# Patient Record
Sex: FEMALE | Race: WHITE | NOT HISPANIC OR LATINO | Employment: PART TIME | URBAN - METROPOLITAN AREA
[De-identification: names, ages, dates, MRNs, and addresses within clinical notes are randomized per-mention and may not be internally consistent; named-entity substitution may affect disease eponyms.]

---

## 2017-03-16 ENCOUNTER — OFFICE VISIT - HEALTHEAST (OUTPATIENT)
Dept: FAMILY MEDICINE | Facility: CLINIC | Age: 27
End: 2017-03-16

## 2017-03-16 DIAGNOSIS — J32.9 BACTERIAL SINUSITIS: ICD-10-CM

## 2017-03-16 DIAGNOSIS — B96.89 BACTERIAL SINUSITIS: ICD-10-CM

## 2017-03-16 ASSESSMENT — MIFFLIN-ST. JEOR: SCORE: 1484.27

## 2017-03-28 ENCOUNTER — OFFICE VISIT - HEALTHEAST (OUTPATIENT)
Dept: FAMILY MEDICINE | Facility: CLINIC | Age: 27
End: 2017-03-28

## 2017-03-28 DIAGNOSIS — J01.90 ACUTE SINUSITIS: ICD-10-CM

## 2017-03-28 DIAGNOSIS — R05.9 COUGH: ICD-10-CM

## 2017-04-13 ENCOUNTER — OFFICE VISIT - HEALTHEAST (OUTPATIENT)
Dept: INTERNAL MEDICINE | Facility: CLINIC | Age: 27
End: 2017-04-13

## 2017-04-13 DIAGNOSIS — Z00.00 ANNUAL PHYSICAL EXAM: ICD-10-CM

## 2017-04-13 ASSESSMENT — MIFFLIN-ST. JEOR: SCORE: 1454.78

## 2017-04-25 ENCOUNTER — COMMUNICATION - HEALTHEAST (OUTPATIENT)
Dept: INTERNAL MEDICINE | Facility: CLINIC | Age: 27
End: 2017-04-25

## 2017-07-12 ENCOUNTER — OFFICE VISIT - HEALTHEAST (OUTPATIENT)
Dept: INTERNAL MEDICINE | Facility: CLINIC | Age: 27
End: 2017-07-12

## 2017-07-12 DIAGNOSIS — Z71.84 TRAVEL ADVICE ENCOUNTER: ICD-10-CM

## 2017-08-07 ENCOUNTER — COMMUNICATION - HEALTHEAST (OUTPATIENT)
Dept: SCHEDULING | Facility: CLINIC | Age: 27
End: 2017-08-07

## 2017-08-07 ENCOUNTER — AMBULATORY - HEALTHEAST (OUTPATIENT)
Dept: INTERNAL MEDICINE | Facility: CLINIC | Age: 27
End: 2017-08-07

## 2017-12-26 ENCOUNTER — OFFICE VISIT - HEALTHEAST (OUTPATIENT)
Dept: FAMILY MEDICINE | Facility: CLINIC | Age: 27
End: 2017-12-26

## 2017-12-26 ENCOUNTER — COMMUNICATION - HEALTHEAST (OUTPATIENT)
Dept: SCHEDULING | Facility: CLINIC | Age: 27
End: 2017-12-26

## 2017-12-26 DIAGNOSIS — R09.81 SINUS CONGESTION: ICD-10-CM

## 2019-01-06 ENCOUNTER — OFFICE VISIT - HEALTHEAST (OUTPATIENT)
Dept: FAMILY MEDICINE | Facility: CLINIC | Age: 29
End: 2019-01-06

## 2019-01-06 DIAGNOSIS — A09 TRAVELER'S DIARRHEA: ICD-10-CM

## 2019-01-07 ENCOUNTER — COMMUNICATION - HEALTHEAST (OUTPATIENT)
Dept: SCHEDULING | Facility: CLINIC | Age: 29
End: 2019-01-07

## 2020-01-09 ENCOUNTER — COMMUNICATION - HEALTHEAST (OUTPATIENT)
Dept: INTERNAL MEDICINE | Facility: CLINIC | Age: 30
End: 2020-01-09

## 2021-05-30 VITALS — BODY MASS INDEX: 25.62 KG/M2 | WEIGHT: 159.4 LBS | HEIGHT: 66 IN

## 2021-05-30 VITALS — WEIGHT: 165.9 LBS | HEIGHT: 66 IN | BODY MASS INDEX: 26.66 KG/M2

## 2021-05-30 VITALS — BODY MASS INDEX: 25.92 KG/M2 | WEIGHT: 160.56 LBS

## 2021-05-31 VITALS — WEIGHT: 155 LBS | BODY MASS INDEX: 25.02 KG/M2

## 2021-05-31 VITALS — BODY MASS INDEX: 26.31 KG/M2 | WEIGHT: 163 LBS

## 2021-06-02 VITALS — WEIGHT: 163.8 LBS | BODY MASS INDEX: 26.44 KG/M2

## 2021-06-09 ENCOUNTER — OFFICE VISIT - HEALTHEAST (OUTPATIENT)
Dept: INTERNAL MEDICINE | Facility: CLINIC | Age: 31
End: 2021-06-09

## 2021-06-09 DIAGNOSIS — Z30.016 ENCOUNTER FOR INITIAL PRESCRIPTION OF TRANSDERMAL PATCH HORMONAL CONTRACEPTIVE DEVICE: ICD-10-CM

## 2021-06-09 RX ORDER — NORELGESTROMIN AND ETHINYL ESTRADIOL 35; 150 UG/MG; UG/MG
1 PATCH TRANSDERMAL
Qty: 4 PATCH | Refills: 5 | Status: SHIPPED | OUTPATIENT
Start: 2021-06-09 | End: 2022-01-13

## 2021-06-09 ASSESSMENT — MIFFLIN-ST. JEOR: SCORE: 1498.33

## 2021-06-09 NOTE — PROGRESS NOTES
"Subjective:      Patient ID: Giovanny Mendez is a 26 y.o. female.    Chief Complaint:    HPI Giovanny Mendez is a 26 y.o. female who presents today complaining of sinus pressure and pain x 9 days. Mucus is a yellow color.  Patient reports that she has been having headaches with pressure along her maxillary sinuses.  She reports that she is also had sinus infections in the past.      No past medical history on file.    No past surgical history on file.    No family history on file.    Social History   Substance Use Topics     Smoking status: Never Smoker     Smokeless tobacco: None     Alcohol use None       Review of Systems   Constitutional: Negative for fever.   HENT: Positive for congestion, sinus pressure, sore throat and voice change. Negative for ear pain.    Respiratory: Positive for cough.    Gastrointestinal: Negative for abdominal pain, diarrhea, nausea and vomiting.   Neurological: Positive for headaches.       Objective:     Visit Vitals     /70     Pulse 71     Temp 97.7  F (36.5  C) (Oral)     Resp 16     Ht 5' 6\" (1.676 m)     Wt 165 lb 14.4 oz (75.3 kg)     SpO2 99%     Breastfeeding Yes     BMI 26.78 kg/m2       Physical Exam   Constitutional: She appears well-developed and well-nourished. No distress.   HENT:   Right Ear: Tympanic membrane normal.   Left Ear: Tympanic membrane normal.   Nose: Right sinus exhibits no maxillary sinus tenderness and no frontal sinus tenderness. Left sinus exhibits no maxillary sinus tenderness and no frontal sinus tenderness.   Mouth/Throat: Uvula is midline. No oropharyngeal exudate, posterior oropharyngeal edema, posterior oropharyngeal erythema or tonsillar abscesses.   Cardiovascular: Normal rate, regular rhythm and normal heart sounds.    No murmur heard.  Pulmonary/Chest: Effort normal and breath sounds normal. No respiratory distress. She has no wheezes. She has no rales.     Considering duration of symptoms patient likely had viral sinusitis with a new " secondary bacterial sinusitis. Patient was prescribed Augmentin ×7 days.  I also recommended the patient to take a probiotic to prevent GI upset or yeast infection from developing after antibiotic use.    Procedures      Assessment / Plan:     1. Bacterial sinusitis  amoxicillin-clavulanate (AUGMENTIN) 875-125 mg per tablet         Patient Instructions   1) Take Augmentin antibiotic twice daily for 7 days.  2) Take Culturelle probiotic three times per day for 10 days.  3) I also recommend Mucinex and Neti pot for congestion relief.  4) Follow up if symptoms do not improve in 5-7 days.

## 2021-06-09 NOTE — PROGRESS NOTES
ASSESSMENT:   1. Acute sinusitis  doxycycline (MONODOX) 100 MG capsule   2. Cough          PLAN:  She has had mild improvement with Augmentin but not until later in the course, so I chose to treat her with doxycycline. Cautioned on photosensitivity.  Recommend probiotic while on antibiotic. Follow up with primary care provider if not improving in 3-5 days, sooner if any worsening or new symptoms.      SUBJECTIVE:   Giovanny Mendez is a 26 y.o. female presents today with 3 weeks complaint of sinus symptoms. She was seen 3/16/17 and treated for sinusitis with Augmentin x 7 days. While on antibiotic, she did not feel like symptoms improved much until the last day or two.  She has continued to have gradual improvement in her symptoms, though still not feeling like she's back to her usual self.  She continues to have a productive cough- mostly when lying down at night, rhinorrhea, thick postnasal drainage.  Feels like her cough is due to the postnasal drainage. Yesterday her ears started popping again.  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 History of recurrent sinusitis, though often responds quite well amoxicillin or Augmentin.     Denies chance of pregnancy and is not breastfeeding.    Denies fever, chills, shortness of breath, chest pain, sore throat.    There is no problem list on file for this patient.      History   Smoking Status     Never Smoker   Smokeless Tobacco     Not on file       Current Medications:  No current outpatient prescriptions on file prior to visit.     No current facility-administered medications on file prior to visit.        Allergies:   No Known Allergies    OBJECTIVE:   Vitals:    03/28/17 1553   BP: 90/60   Patient Site: Left Arm   Patient Position: Sitting   Cuff Size: Adult Regular   Pulse: 68   Resp: 16   Temp: 98.1  F (36.7  C)   TempSrc: Oral   SpO2: 99%   Weight: 160 lb 9 oz (72.8 kg)     Physical exam reveals a pleasant 26 y.o.  female.   Appears healthy, alert, cooperative and in NAD.  Eyes:  No conjunctivitis, lids normal.   Ears:  both TM's with a visible air bubble of serous fluid.  TMs are thin, without erythema, with good light reflex.  Nose:    Mucosa pink, congested.  Mouth:  Mucosa pink and moist.  no pharyngitis, no exudate and thick postnasal drainage visualized in posterior pharynx. Uvula is midline.    Lymph: no cervical LAD  Lungs: Chest is clear, no wheezing, rhonchi or rales. Symmetric air entry throughout both lung fields.  Heart: regular rate and rhythm, no murmur, rub or gallop

## 2021-06-10 NOTE — PROGRESS NOTES
Assessment/Plan:     1. Annual physical exam  - Gynecologic Cytology (PAP Smear)  - Reviewed the importance of monitoring for changes in breast appearance such as nipple inversion, changes in breast tissue texture, non-healing wounds, or nipple discharge   - The following high BMI interventions were performed this visit: encouragement to exercise        Subjective:     Giovanny Mendez is a 26 y.o. female who presents for an annual exam. She is not sexually active, no need for contraception. Declines STI testing today. Her periods are regular, no heavy/painful menstrual bleeding. She has never had a PAP smear before.     The patient reports that there is not domestic violence in her life.     Healthy Habits:   Regular Exercise: No, not recently. Will get back into a routine   Sunscreen Use: Yes  Healthy Diet: Yes, in general   Dental Visits Regularly: Yes  Sexually active: not currently       Immunization History   Administered Date(s) Administered     Tdap 04/17/2013     Immunization status: declines influenza vaccine     Gynecologic History  Patient's last menstrual period was 03/23/2017 (approximate).  Contraception: abstinence      OB History   No data available       No current outpatient prescriptions on file.     No current facility-administered medications for this visit.      History reviewed. No pertinent past medical history.  History reviewed. No pertinent surgical history.  Review of patient's allergies indicates no known allergies.  Family History   Problem Relation Age of Onset     No Medical Problems Mother      No Medical Problems Father      No Medical Problems Brother      Diabetes Maternal Grandfather      Social History     Social History     Marital status: Single     Spouse name: N/A     Number of children: N/A     Years of education: N/A     Occupational History     Not on file.     Social History Main Topics     Smoking status: Never Smoker     Smokeless tobacco: Not on file     Alcohol use  "0.6 oz/week     1 Standard drinks or equivalent per week     Drug use: No     Sexual activity: Not Currently     Partners: Male     Other Topics Concern     Not on file     Social History Narrative       Review of Systems  General:  Negative except as noted above  Eyes: Negative except as noted above  Ears/Nose/Throat: Negative except as noted above  Cardiovascular: Negative except as noted above  Respiratory:  Negative except as noted above  Gastrointestinal:  Negative except as noted above  Musculoskeletal:  Negative except as noted above  Skin: Negative except as noted above  Neurologic: Negative except as noted above  Psychiatric: Negative except as noted above  Endocrine: Negative except as noted above  Heme/Lymphatic: Negative except as noted above   Allergic/Immunologic: Negative except as noted above      Objective:      Vitals:    04/13/17 1450   BP: 118/68   Pulse: 80   Weight: 159 lb 6.4 oz (72.3 kg)   Height: 5' 6\" (1.676 m)     Wt Readings from Last 3 Encounters:   04/13/17 159 lb 6.4 oz (72.3 kg)   03/28/17 160 lb 9 oz (72.8 kg)   03/16/17 165 lb 14.4 oz (75.3 kg)     Body mass index is 25.73 kg/(m^2).     Physical Exam:  General Appearance: Alert, cooperative, no distress.  Head: Normocephalic, without obvious abnormality, atraumatic  Eyes: PERRL, conjunctiva/corneas clear, EOM's intact  Ears: Normal TM's and external ear canals, both ears  Nose: Nares normal, septum midline,mucosa normal, no drainage  Throat: Lips, mucosa, and tongue normal  Neck: Supple, symmetrical, trachea midline, no adenopathy;  thyroid: not enlarged, symmetric, no tenderness/mass/nodules  Lungs: Clear to auscultation bilaterally, respirations unlabored  Heart: Regular rate and rhythm, S1 and S2 normal, no murmur, rub, or gallop  Abdomen: Soft, non-tender, bowel sounds active all four quadrants,  no masses, no organomegaly  Pelvic: Genital: EXTERNAL GENITALIA: Normal appearing vulva without masses, tenderness or lesions. " PERINEUM: normal and intact. URETHRAL MEATUS: normal VAGINA:  vagina with normal color and without discharge or lesions. CERVIX: normal appearing cervix without discharge or lesions. Non-friable. No CMT. UTERUS: uterus is normal size, shape, consistency, and non-tender. ADNEXA: no tenderness or fullness.   Extremities: Extremities normal, atraumatic, no cyanosis or edema  Skin: Skin color, texture, turgor normal, no rashes or lesions  Lymph nodes: Cervical, supraclavicular nodes normal  Neurologic: Normal  Psych: Normal affect.  Does not appear anxious or depressed.

## 2021-06-11 NOTE — PROGRESS NOTES
Internal Medicine Office Visit  Patient Name: Giovanny Mendez  Patient Age: 26 y.o.  YOB: 1990  MRN: [id number]  ?  Date of Visit: 2017  Reason for Office Visit:   Chief Complaint   Patient presents with     Travel Consult     [place], [place] trip, 17-17       Assessment / Plan / Medical Decision Makin. Travel advice encounter  - typhoid and hepatitis A recommended  - Malaria prophylaxis options discussed, she will start chloroquine. Dosing instructions reviewed        Health Maintenance Review  Health Maintenance   Topic Date Due     ADVANCE DIRECTIVES DISCUSSED WITH PATIENT  2007     INFLUENZA VACCINE RULE BASED (1) 2017     PAP SMEAR  2020     TD 18+ HE  2023     TDAP ADULT ONE TIME DOSE  Completed         I am having Ms. Mendez start on chloroquine.     HPI:   Encounter Diagnoses   Name Primary?     Travel advice encounter Yes     Giovanny Mendez is a 25 y/o female who presents to the office today for a travel consult. 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 Food will be provided to their group with preparation with clean water. It is unlikely she will eat street food but their  did recommend typhoid vaccine.     Review of Systems: Negative 10-point ROS        Current Scheduled Meds:  Outpatient Encounter Prescriptions as of 2017   Medication Sig Dispense Refill     chloroquine (ARALEN) 500 mg tablet Take 1 tablet (500 mg total) by mouth once a week. Take 2 weeks prior to trip, while gone, and x 4 weeks upon return to [place] 8 tablet 0     No facility-administered encounter medications on file as of 2017.      No past medical history on file.  No past surgical history on file.  Social History   Substance Use Topics     Smoking status: Never Smoker     Smokeless tobacco: None     Alcohol use 0.6 oz/week     1 Standard  drinks or equivalent per week       Objective / Physical Examination:  Vitals:    07/12/17 1330   BP: 106/60   Pulse: 74   Weight: 155 lb (70.3 kg)     Wt Readings from Last 3 Encounters:   07/12/17 155 lb (70.3 kg)   04/13/17 159 lb 6.4 oz (72.3 kg)   03/28/17 160 lb 9 oz (72.8 kg)     Body mass index is 25.02 kg/(m^2).     General Appearance: Alert and oriented, cooperative, affect appropriate, speech clear, in no apparent distress      Orders Placed This Encounter   Procedures     Hepatitis A vaccine adult IM     Typhoid, Inactive, Inj   Followup: Return in about 1 year (around 7/12/2018) for Annual physical. earlier if needed.    Total time spent with patient was 15 minutes with >50% of time spent in face-to-face counseling regarding the above plan       Kristie Diaz, CNP  [facility]

## 2021-06-15 NOTE — PROGRESS NOTES
Assessment:      Sinus congestion      Plan:      1. Sudafed  2. Trial of nasal steroids  3. Nasal saline rinses as needed for congestion.  4. Discussed signs of worsening infection and when to follow-up with PCP if no symptom improvement.     Patient Instructions   You were seen today for sinus congestion and/or pain. This is likely due to a viral illness.    Symptoms management:  - May use Tylenol or Ibuprofen for discomfort and/or fever if present  - May try saline irrigation to relieve congestion (see instructions below)  - Use of nasal steroids as prescribed  - If you are experiencing ear fullness, may try an oral decongestant such as sudafed    Reasons to come back for re-evaluation:  - Develop a fever of 102F (38.9C)  - Sudden and severe pain in the face and head  - Troubles seeing or double vision  - Swelling or redness around one or both eyes  - Sfiff neck  - Symptoms have not improved after 5 days    Buffered normal saline nasal irrigation   The benefits   1. Saline (saltwater) washes the mucus and irritants from your nose.   2. The sinus passages are moisturized.   3. Studies have also shown that a nasal irrigation improves cell function (the cells that move the mucus work better).   The recipe   Use a one-quart glass jar that is thoroughly cleansed.   You may use a large medical syringe (30 cc), water pick with an irrigation tip (preferred method), squeeze bottle, or Neti pot. Do not use a baby bulb syringe. The syringe or pick should be sterilized frequently or replaced every two to three weeks to avoid contamination and infection.   Fill with water that has been distilled, previously boiled, or otherwise sterilized. Plain tap water is not recommended, because it is not necessarily sterile.   Add 1 to 1  heaping teaspoons of pickling/juan alberto salt. Do not use table salt, because it contains a large number of additives.   Add 1 teaspoon of baking soda (pure bicarbonate).   Mix ingredients together, and  store at room temperature. Discard after one week.   You may also make up a solution from premixed packets that are commercially prepared specifically for nasal irrigation.   The instructions   Irrigate your nose with saline one to two times per day.   If you have been told to use nasal medication, you should always use your saline solution first. The nasal medication is much more effective when sprayed onto clean nasal membranes, and the spray will reach deeper into the nose.   Pour the amount of fluid you plan to use into a clean bowl. Do not put your used syringe back into the storage container, because it contaminates your solution.   You may warm the solution slightly in the microwave, but be sure that the solution is not hot.   Bend over the sink (some people do this in the shower) and squirt the solution into each side of your nose, aiming the stream toward the back of your head, not the top of your head. The solution should flow into one nostril and out of the other, but it will not harm you if you swallow a little.   Some people experience a little burning sensation the first few times they use buffered saline solution, but this usually goes away after they adapt to it.         Subjective:      Giovanny Mendez is a 27 y.o. female who presents for evaluation of possible sinus infection. Symptoms include ear popping, sinus congestion, rhinorrhea, purulent nasal discharge, and cough with no fever, chills, night sweats or weight loss. Onset of symptoms was 2 weeks ago, unchanged since that time.  She is drinking moderate amounts of fluids. Denies fever, ear pain, sore throat, and chest pain.    The following portions of the patient's history were reviewed and updated as appropriate: allergies, current medications and problem list.    Review of Systems  Pertinent items are noted in HPI.    Allergies  No Known Allergies     Objective:      /60  Pulse 65  Temp 97.5  F (36.4  C) (Oral)   Resp 14  Wt 163  lb (73.9 kg)  LMP 12/24/2017 (Exact Date)  SpO2 100%  Breastfeeding? No  BMI 26.31 kg/m2  General appearance: alert, appears stated age, cooperative, no distress and non-toxic  Head: Normocephalic, without obvious abnormality, atraumatic, sinuses nontender to percussion  Ears: TM's intact with mucoid fluid present, no erythema, no bulging; external ears normal  Nose: no discharge  Throat: no tonsil swelling, no erythema, no exudate, MMM, lips and tongue normal  Neck: no adenopathy and supple, symmetrical, trachea midline  Lungs: clear to auscultation bilaterally  Heart: regular rate and rhythm, S1, S2 normal, no murmur, click, rub or gallop

## 2021-06-17 NOTE — PATIENT INSTRUCTIONS - HE
Patient Instructions by Jonathan Velasquez PA-C at 1/6/2019  8:30 AM     Author: Jonathan Velasquez PA-C Service: -- Author Type: Physician Assistant    Filed: 1/6/2019  2:27 PM Encounter Date: 1/6/2019 Status: Addendum    : Jonathan Velasquez PA-C (Physician Assistant)    Related Notes: Original Note by Jonathan Velasquez PA-C (Physician Assistant) filed at 1/6/2019  9:17 AM          Avoid alcohol caffeine nicotine which may dehydrate you.  Bowel rest for the first day.  Primarily water  Transition back to eating with small meals and avoid heavy meals with fat.  Follow suggestions in the handout below.  Return to clinic if you are unable to stop the diarrhea after 7 days or if there is blood or mucus in the stools significant pain or cramping or dehydration you are not able to orally hydrate.  Avoid over-the-counter anti-diarrheal medicine        Patient Education     Traveler's Diarrhea (Adult)    Traveler's diarrhea is an infection in the intestinal tract that is usually caused by bacteria called E coli. This bacteria is commonly found in water supplies of developing countries. The local people of those countries are used to E coli in the water and don't get sick. Tourists who drink contaminated water or eat foods that were washed or prepared with this water may become very ill.  The illness begins 1 to 3 days after exposure and lasts up to 5 days. Symptoms include fever, vomiting, stomach cramping, and watery diarrhea. There may also be blood or mucus in the stool. Mild cases will get better without treatment. Antibiotics are used for more severe cases.  Home care    If you were prescribed antibiotics, take them until they are finished.    You may use acetaminophen or ibuprofen to control fever, unless another medicine was prescribed. If you have chronic liver or kidney disease or have ever had a stomach ulcer or gastrointestinal  bleeding, talk with the healthcare provider before using these medicines. Aspirin should never  be used in anyone under 18 years of age who is ill with a fever. It may cause severe illness or death.    Do not take over-the-counter antidiarrheal medicines, unless advised by the healthcare provider.  Once vomiting stops, follow these guidelines:  During the first 12 to 24 hours, follow the diet below:    Fruit juices. Apple, grape and cranberry juice; clear fruit drinks, electrolyte replacement and sports drinks.    Beverages. Soft drinks without caffeine; mineral water (plain or flavored); decaffeinated tea and coffee    Soups. Clear broth, consommé and bouillon.    Desserts. Plain gelatin, popsicles and fruit juice bars; As you feel better, you may add 6 to 8 oz of yogurt per day.  During the next 24 hours, you may add the following to the above:    Hot cereal, plain toast, bread, rolls, or crackers    Plain noodles, rice, mashed potatoes, or chicken noodle or rice soup    Unsweetened canned fruit (avoid pineapple), bananas    Limit fat intake to less than 15 grams per day by avoiding margarine, butter, oils, mayonnaise, sauces, gravies, fried foods, peanut butter, meat, poultry, and fish.    Limit fiber; avoid raw or cooked vegetables, fresh fruits (except bananas), and bran cereals.    Limit caffeine and chocolate. No spices or seasonings except salt.  During the next 24 hours, you can gradually resume a normal diet as the symptoms lessen.  Follow-up care  Follow up with your healthcare provider, or as advised. Call if you are not improving within 24 hours or if the diarrhea lasts more than 1 week on antibiotics. If a stool (diarrhea) sample was taken, you may call in 2 days (or as directed) for the results.  When to seek medical advice  Call your healthcare provider if any of these happen:    Severe constant pain in the lower part of the belly, on the right side    Blood in diarrhea or vomit, dark coffee ground appearing vomit, or dark tarry stools    Continued vomiting (unable to keep liquids  down)    Frequent diarrhea (more than 5 times a day); blood (red or black) or mucus in diarrhea    Reduced oral intake    Reduced urine output or extreme thirst    Weakness, dizziness, or fainting    Drowsiness, confusion, stiff neck, or seizure    Fever (1 degree above your normal temperature) lasting 24 to 48 hours, or whatever your healthcare provider told you to report based on your condition  Date Last Reviewed: 11/1/2017 2000-2017 [employer]. [address] [place] [address]. All rights reserved. This information is not intended as a substitute for professional medical care. Always follow your healthcare professional's instructions.

## 2021-06-22 NOTE — TELEPHONE ENCOUNTER
"Call from pt     CC: Travelers Diarrrhea and started on 3 day course of azithromycin at clinic yesterday     >Yesterday may have had as many as \"15 to 20\" episodes of diarrhea   >Vomit x 1 yesterday   > Brief chills - took IBU - ok today   > Still having diarrhea today - 5 episodes today so far    > Urination ok    > Not dizzy or lightheaded   >ABD \"cramping\" (same as before) - 5 out of 10   > No blood in stool       At Home:    > water   > soup   > ABX   >Bananas   >Crackers       Plan:   >OK to manage at home - She has been able to keep up with fluid loses - not dizzy / lightheaded   >Finish ABX - one more dose tomorrow   >Hydration - clear broth is good    >hold solid food for next several hours   >Watch for fever   >CB if not improving after course of ABX or if worsening sx       Jamie Mims, RN   Triage and Medication Refills          Reason for Disposition    SEVERE diarrhea (e.g., 7 or more times / day more than normal)    Protocols used: DIARRHEA-A-OH      "

## 2021-06-26 NOTE — PROGRESS NOTES
Internal Medicine Office Visit  [facility]   Patient Name: Giovanny Mendez  Patient Age: 30 y.o.  YOB: 1990  MRN: [id number]    Date of Visit: 6/9/2021  Reason for Office Visit:   Chief Complaint   Patient presents with     Contraception     Discuss forms of BC.            Assessment / Plan / Medical Decision Making:    Problem List Items Addressed This Visit     None      Visit Diagnoses     Encounter for initial prescription of transdermal patch hormonal contraceptive device    -  Primary    Relevant Medications    norelgestromin-ethinyl estradiol (ORTHO EVRA) 150-35 mcg/24 hr         Pap smear declined today, she will schedule this for later this summer  Reviewed hormonal birth control options with onset quick enough to work with the timeline of a wedding in 2 months. Advised extended cycle use of either Nuvaring, patch, or CLEMENT. She is most interested in the patch and will continue with weekly application of the patch for 12 weeks followed by a 1 week patch free week. Reviewed risks of VTE, spotting. Reviewed symptoms that could suggest VTE and prevention with avoidance of inactivity and tobacco, good hydration       I am having Giovanny Mendez start on norelgestromin-ethinyl estradiol.                No orders of the defined types were placed in this encounter.  Followup: Return in about 4 weeks (around 7/7/2021) for Annual physical. earlier if needed.        Kristie Diaz CNP        HPI:  Giovanny Mendez is a 30 y.o. year old who presents to the office today to review contraception options. She is most interested in this as she is getting  in August and based on her typical cycle she is likely to get her period on her wedding date. She leaves for a honeymoon shortly after her wedding.         Health Maintenance Review  Health Maintenance   Topic Date Due     HEPATITIS C SCREENING  Never done     COVID-19 Vaccine (1) Never done     HIV SCREENING  Never done      "ADVANCE CARE PLANNING  Never done     PREVENTIVE CARE VISIT  04/13/2018     PAP SMEAR  04/13/2020     INFLUENZA VACCINE RULE BASED (Season Ended) 08/01/2021     TD 18+ HE  04/17/2023     HEPATITIS B VACCINES  Completed     TDAP ADULT ONE TIME DOSE  Completed     Pneumococcal Vaccine: Pediatrics (0 to 5 Years) and At-Risk Patients (6 to 64 Years)  Aged Out       Current Scheduled Meds:  Outpatient Encounter Medications as of 6/9/2021   Medication Sig Dispense Refill     norelgestromin-ethinyl estradiol (ORTHO EVRA) 150-35 mcg/24 hr Place 1 patch on the skin every 7 days. 4 patch 5     No facility-administered encounter medications on file as of 6/9/2021.          Objective / Physical Examination:  Vitals:    06/09/21 1514   Pulse: 77   SpO2: 98%   Weight: 169 lb (76.7 kg)   Height: 5' 6\" (1.676 m)     Wt Readings from Last 3 Encounters:   06/09/21 169 lb (76.7 kg)   01/06/19 163 lb 12.8 oz (74.3 kg)   12/26/17 163 lb (73.9 kg)     Body mass index is 27.28 kg/m .     Constitutional: In no apparent distress      "

## 2021-06-27 NOTE — PROGRESS NOTES
Progress Notes by Jonathan Velasquez PA-C at 1/6/2019  8:30 AM     Author: Jonathan Velasquez PA-C Service: -- Author Type: Physician Assistant    Filed: 1/6/2019  2:27 PM Encounter Date: 1/6/2019 Status: Signed    : Jonathan Velasquez PA-C (Physician Assistant)       Subjective:      Patient ID: Giovanny Mendez is a 28 y.o. female.    Chief Complaint:    HPI  Giovanny Mendez is a 28 y.o. female who presents today complaining of concern for diarrhea after traveling to the [place].  Patient recounts a past medical history for being in the [place] for 1 week.  She returned to the [place] on Friday.  She developed diarrhea starting on Thursday, 4 days ago.  She has had multiple loose watery stools with abdominal cramping.  She has not had fever or vomiting.  He does not report blood or mucus in the stools.     She has had difficulty with traveler's diarrhea in the past and she does feel that this is similar to what she has had specifically from returning from [place] and in the past.     No past medical history on file.    No past surgical history on file.    Family History   Problem Relation Age of Onset   ? No Medical Problems Mother    ? No Medical Problems Father    ? No Medical Problems Brother    ? Diabetes Maternal Grandfather        Social History     Tobacco Use   ? Smoking status: Never Smoker   ? Smokeless tobacco: Never Used   Substance Use Topics   ? Alcohol use: Yes     Alcohol/week: 0.6 oz     Types: 1 Standard drinks or equivalent per week   ? Drug use: No       Review of Systems  As above in HPI, otherwise balance of Review of Systems are negative.    Objective:     BP (!) 82/50 (Patient Site: Right Arm, Patient Position: Sitting, Cuff Size: Adult Regular)   Pulse 100   Temp 99.6  F (37.6  C) (Oral)   Resp 20   Wt 163 lb 12.8 oz (74.3 kg)   SpO2 98%   BMI 26.44 kg/m      Physical Exam  General: Patient is resting comfortably no acute distress is afebrile  HEENT: Head  is normocephalic atraumatic   eyes are PERRL EOMI sclera anicteric   TMs are clear bilaterally  Throat is with mild pharyngeal wall erythema and no exudate  No cervical lymphadenopathy present  LUNGS: Clear to auscultation bilaterally  HEART: Regular rate and rhythm  Abdomen: The abdomen is soft nontender no rebound no guarding no masses normoactive bowel sounds x 4.  Skin: Without rash non-diaphoretic capillary refill is at 3 seconds.  Oral mucous membranes are moist and skin is with good turgor    Assessment:     Procedures    1. Traveler's diarrhea         Plan:     1. Traveler's diarrhea         Patient Instructions      Avoid alcohol caffeine nicotine which may dehydrate you.  Bowel rest for the first day.  Primarily water  Transition back to eating with small meals and avoid heavy meals with fat.  Follow suggestions in the handout below.  Return to clinic if you are unable to stop the diarrhea after 7 days or if there is blood or mucus in the stools significant pain or cramping or dehydration you are not able to orally hydrate.  Avoid over-the-counter anti-diarrheal medicine        Patient Education     Traveler's Diarrhea (Adult)    Traveler's diarrhea is an infection in the intestinal tract that is usually caused by bacteria called E coli. This bacteria is commonly found in water supplies of developing countries. The local people of those countries are used to E coli in the water and don't get sick. Tourists who drink contaminated water or eat foods that were washed or prepared with this water may become very ill.  The illness begins 1 to 3 days after exposure and lasts up to 5 days. Symptoms include fever, vomiting, stomach cramping, and watery diarrhea. There may also be blood or mucus in the stool. Mild cases will get better without treatment. Antibiotics are used for more severe cases.  Home care    If you were prescribed antibiotics, take them until they are finished.    You may use acetaminophen or  ibuprofen to control fever, unless another medicine was prescribed. If you have chronic liver or kidney disease or have ever had a stomach ulcer or gastrointestinal  bleeding, talk with the healthcare provider before using these medicines. Aspirin should never be used in anyone under 18 years of age who is ill with a fever. It may cause severe illness or death.    Do not take over-the-counter antidiarrheal medicines, unless advised by the healthcare provider.  Once vomiting stops, follow these guidelines:  During the first 12 to 24 hours, follow the diet below:    Fruit juices. Apple, grape and cranberry juice; clear fruit drinks, electrolyte replacement and sports drinks.    Beverages. Soft drinks without caffeine; mineral water (plain or flavored); decaffeinated tea and coffee    Soups. Clear broth, consommé and bouillon.    Desserts. Plain gelatin, popsicles and fruit juice bars; As you feel better, you may add 6 to 8 oz of yogurt per day.  During the next 24 hours, you may add the following to the above:    Hot cereal, plain toast, bread, rolls, or crackers    Plain noodles, rice, mashed potatoes, or chicken noodle or rice soup    Unsweetened canned fruit (avoid pineapple), bananas    Limit fat intake to less than 15 grams per day by avoiding margarine, butter, oils, mayonnaise, sauces, gravies, fried foods, peanut butter, meat, poultry, and fish.    Limit fiber; avoid raw or cooked vegetables, fresh fruits (except bananas), and bran cereals.    Limit caffeine and chocolate. No spices or seasonings except salt.  During the next 24 hours, you can gradually resume a normal diet as the symptoms lessen.  Follow-up care  Follow up with your healthcare provider, or as advised. Call if you are not improving within 24 hours or if the diarrhea lasts more than 1 week on antibiotics. If a stool (diarrhea) sample was taken, you may call in 2 days (or as directed) for the results.  When to seek medical advice  Call your  healthcare provider if any of these happen:    Severe constant pain in the lower part of the belly, on the right side    Blood in diarrhea or vomit, dark coffee ground appearing vomit, or dark tarry stools    Continued vomiting (unable to keep liquids down)    Frequent diarrhea (more than 5 times a day); blood (red or black) or mucus in diarrhea    Reduced oral intake    Reduced urine output or extreme thirst    Weakness, dizziness, or fainting    Drowsiness, confusion, stiff neck, or seizure    Fever (1 degree above your normal temperature) lasting 24 to 48 hours, or whatever your healthcare provider told you to report based on your condition  Date Last Reviewed: 11/1/2017 2000-2017 [employer]. [address] [place] [address], [place] [address]. All rights reserved. This information is not intended as a substitute for professional medical care. Always follow your healthcare professional's instructions.

## 2021-07-06 VITALS — WEIGHT: 169 LBS | HEART RATE: 77 BPM | HEIGHT: 66 IN | OXYGEN SATURATION: 98 % | BODY MASS INDEX: 27.16 KG/M2

## 2021-08-21 ENCOUNTER — HEALTH MAINTENANCE LETTER (OUTPATIENT)
Age: 31
End: 2021-08-21

## 2021-09-07 ENCOUNTER — OFFICE VISIT (OUTPATIENT)
Dept: FAMILY MEDICINE | Facility: CLINIC | Age: 31
End: 2021-09-07
Payer: COMMERCIAL

## 2021-09-07 VITALS
RESPIRATION RATE: 16 BRPM | HEART RATE: 67 BPM | OXYGEN SATURATION: 99 % | TEMPERATURE: 97.7 F | SYSTOLIC BLOOD PRESSURE: 99 MMHG | WEIGHT: 169 LBS | DIASTOLIC BLOOD PRESSURE: 64 MMHG | BODY MASS INDEX: 27.28 KG/M2

## 2021-09-07 DIAGNOSIS — N89.8 VAGINAL DISCHARGE: ICD-10-CM

## 2021-09-07 DIAGNOSIS — N39.0 URINARY TRACT INFECTION WITHOUT HEMATURIA, SITE UNSPECIFIED: Primary | ICD-10-CM

## 2021-09-07 LAB
ALBUMIN UR-MCNC: NEGATIVE MG/DL
APPEARANCE UR: ABNORMAL
BACTERIA #/AREA URNS HPF: ABNORMAL /HPF
BILIRUB UR QL STRIP: NEGATIVE
CLUE CELLS: ABNORMAL
COLOR UR AUTO: YELLOW
GLUCOSE UR STRIP-MCNC: NEGATIVE MG/DL
HGB UR QL STRIP: ABNORMAL
KETONES UR STRIP-MCNC: NEGATIVE MG/DL
LEUKOCYTE ESTERASE UR QL STRIP: ABNORMAL
NITRATE UR QL: NEGATIVE
PH UR STRIP: 5.5 [PH] (ref 5–8)
RBC #/AREA URNS AUTO: ABNORMAL /HPF
SP GR UR STRIP: >=1.03 (ref 1–1.03)
SQUAMOUS #/AREA URNS AUTO: ABNORMAL /LPF
TRICHOMONAS, WET PREP: ABNORMAL
UROBILINOGEN UR STRIP-ACNC: 0.2 E.U./DL
WBC #/AREA URNS AUTO: ABNORMAL /HPF
WBC'S/HIGH POWER FIELD, WET PREP: ABNORMAL
YEAST, WET PREP: ABNORMAL

## 2021-09-07 PROCEDURE — 87210 SMEAR WET MOUNT SALINE/INK: CPT | Performed by: PHYSICIAN ASSISTANT

## 2021-09-07 PROCEDURE — 81001 URINALYSIS AUTO W/SCOPE: CPT | Performed by: PHYSICIAN ASSISTANT

## 2021-09-07 PROCEDURE — 99213 OFFICE O/P EST LOW 20 MIN: CPT | Performed by: PHYSICIAN ASSISTANT

## 2021-09-07 RX ORDER — CEPHALEXIN 500 MG/1
500 CAPSULE ORAL 2 TIMES DAILY
Qty: 20 CAPSULE | Refills: 0 | Status: SHIPPED | OUTPATIENT
Start: 2021-09-07 | End: 2021-09-17

## 2021-09-07 NOTE — PROGRESS NOTES
Patient presents with:  UTI: tingling and dischage       Clinical Decision Making:  Patient is treated for urinary tract infection.  She is treated with Keflex with a long course.  She has had a negative vaginitis panel. Expected course of resolution and indication for return was gone over and questions were answered to patient/parent's satisfaction before discharge.        ICD-10-CM    1. Urinary tract infection without hematuria, site unspecified  N39.0 cephALEXin (KEFLEX) 500 MG capsule   2. Vaginal discharge  N89.8 UA macro with reflex to Microscopic and Culture - Clinc Collect     Wet prep - Clinic Collect     Urine Microscopic       Patient Instructions   Increased fluids and rest.  Discussed signs and symptoms of ascending urinary tract infection symptoms to include pyelonephritis. Instructed to return to clinic if there are increased fever chills night sweats fatigue abdominal pain or flank pain  Antibiotic as written. Risks and benefits of medication discussed.  Indication for return to clinic.        Urinary Tract Infections in Women    Urinary tract infections (UTIs) are most often caused by bacteria (germs). These bacteria enter the urinary tract. The bacteria may come from outside the body. Or they may travel from the skin outside the rectum or vagina into the urethra. Female anatomy makes it easier for bacteria from the bowel to enter a woman s urinary tract, which is the most common source of UTI. This means women develop UTIs more often than men. Pain in or around the urinary tract is a common UTI symptom. But the only way to know for sure if you have a UTI for the health care provider to test your urine. The two tests that may be done are the urinalysis and urine culture.  Types of UTIs    Cystitis: A bladder infection (cystitis) is the most common UTI in women. You may have urgent or frequent urination. You may also have pain, burning when you urinate, and bloody urine.    Urethritis: This is an  inflamed urethra, which is the tube that carries urine from the bladder to outside the body. You may have lower stomach or back pain. You may also have urgent or frequent urination.    Pyelonephritis: This is a kidney infection. If not treated, it can be serious and damage your kidneys. In severe cases, you may be hospitalized. You may have a fever and lower back pain.  Medications to treat a UTI  Most UTIs are treated with antibiotics. These kill the bacteria. The length of time you need to take them depends on the type of infection. It may be as short as 3 days. If you have repeated UTIs, a low-dose antibiotic may be needed for several months. Take antibiotics exactly as directed. Don t stop taking them until all of the medication is gone. If you stop taking the antibiotic too soon, the infection may not go away, and you may develop a resistance to the antibiotic. This can make it much harder to treat.  Lifestyle changes to treat and prevent UTIs  The lifestyle changes below will help get rid of your UTI. They may also help prevent future UTIs.    Drink plenty of fluids. This includes water, juice, or other caffeine-free drinks. Fluids help flush bacteria out of your body.    Empty your bladder. Always empty your bladder when you feel the urge to urinate. And always urinate before going to sleep. Urine that stays in your bladder can lead to infection. Try to urinate before and after sex as well.    Practice good personal hygiene. Wipe yourself from front to back after using the toilet. This helps keep bacteria from getting into the urethra.    Use condoms during sex. These help prevent UTIs caused by sexually transmitted bacteria. Also, avoid using spermicides during sex. These can increase the risk of UTIs. Choose other forms of birth control instead. For women who tend to get UTIs after sex, a low-dose of a preventive antibiotic may be used. Be sure to discuss this option with your health care  provider.    Follow up with your health care provider as directed. He or she may test to make sure the infection has cleared. If necessary, additional treatment may be started.  Date Last Reviewed: 9/8/2014 2000-2016 [employer]. [address] [place] [address], [place] [address]. All rights reserved. This information is not intended as a substitute for professional medical care. Always follow your healthcare professional's instructions.                          HPI:  Giovanny Mendez is a 30 year old female who presents today Day history of irritative voiding symptoms to include urinary hesitancy urgency frequency and dysuria.  Patient denies gross hematuria.  Denies fever chills night sweats fatigue or other red flag symptoms to include back or flank pain and has had a small amount of vaginal discharge.  She has had a recent urinary tract infections does feel similar to how her other symptoms have been.    History obtained from chart review and the patient.    Problem List:  There are no relevant problems documented for this patient.      No past medical history on file.    Social History     Tobacco Use     Smoking status: Never Smoker     Smokeless tobacco: Never Used   Substance Use Topics     Alcohol use: Yes     Alcohol/week: 1.0 standard drinks       Review of Systems  As above in HPI otherwise negative.    Vitals:    09/07/21 1557   BP: 99/64   Pulse: 67   Resp: 16   Temp: 97.7  F (36.5  C)   TempSrc: Oral   SpO2: 99%   Weight: 76.7 kg (169 lb)     General: Patient is resting comfortably no acute distress is afebrile  HEENT: Head is normocephalic atraumatic   eyes are PERRL EOMI sclera anicteric   No cervical lymphadenopathy present  LUNGS: Clear to auscultation bilaterally  HEART: Regular rate and rhythm  Abdomen: Nontender nondistended no rebound or guarding no masses no CVA tenderness to percussion slight suprapubic tenderness to palpation.  Skin: Without rash non-diaphoretic      Physical  Exam    Labs:  Results for orders placed or performed in visit on 09/07/21   UA macro with reflex to Microscopic and Culture - Clinc Collect     Status: Abnormal    Specimen: Urine, Clean Catch   Result Value Ref Range    Color Urine Yellow Colorless, Straw, Light Yellow, Yellow    Appearance Urine Cloudy (A) Clear    Glucose Urine Negative Negative mg/dL    Bilirubin Urine Negative Negative    Ketones Urine Negative Negative mg/dL    Specific Gravity Urine >=1.030 1.005 - 1.030    Blood Urine Moderate (A) Negative    pH Urine 5.5 5.0 - 8.0    Protein Albumin Urine Negative Negative mg/dL    Urobilinogen Urine 0.2 0.2, 1.0 E.U./dL    Nitrite Urine Negative Negative    Leukocyte Esterase Urine Trace (A) Negative   Urine Microscopic     Status: Abnormal   Result Value Ref Range    Bacteria Urine Moderate (A) None Seen /HPF    RBC Urine 5-10 (A) 0-2 /HPF /HPF    WBC Urine 5-10 (A) 0-5 /HPF /HPF    Squamous Epithelials Urine Moderate (A) None Seen /LPF    Narrative    Urine Culture not indicated   Wet prep - Clinic Collect     Status: Abnormal    Specimen: Vagina; Swab   Result Value Ref Range    Trichomonas Absent Absent    Yeast Absent Absent    Clue Cells Absent Absent    WBCs/high power field 1+ (A) None       At the end of the encounter, I discussed results, diagnosis, medications. Discussed red flags for immediate return to clinic/ER, as well as indications for follow up if no improvement. Patient understood and agreed to plan. Patient was stable for discharge.

## 2021-09-07 NOTE — PATIENT INSTRUCTIONS
Increased fluids and rest.  Discussed signs and symptoms of ascending urinary tract infection symptoms to include pyelonephritis. Instructed to return to clinic if there are increased fever chills night sweats fatigue abdominal pain or flank pain  Antibiotic as written. Risks and benefits of medication discussed.  Indication for return to clinic.        Urinary Tract Infections in Women    Urinary tract infections (UTIs) are most often caused by bacteria (germs). These bacteria enter the urinary tract. The bacteria may come from outside the body. Or they may travel from the skin outside the rectum or vagina into the urethra. Female anatomy makes it easier for bacteria from the bowel to enter a woman s urinary tract, which is the most common source of UTI. This means women develop UTIs more often than men. Pain in or around the urinary tract is a common UTI symptom. But the only way to know for sure if you have a UTI for the health care provider to test your urine. The two tests that may be done are the urinalysis and urine culture.  Types of UTIs    Cystitis: A bladder infection (cystitis) is the most common UTI in women. You may have urgent or frequent urination. You may also have pain, burning when you urinate, and bloody urine.    Urethritis: This is an inflamed urethra, which is the tube that carries urine from the bladder to outside the body. You may have lower stomach or back pain. You may also have urgent or frequent urination.    Pyelonephritis: This is a kidney infection. If not treated, it can be serious and damage your kidneys. In severe cases, you may be hospitalized. You may have a fever and lower back pain.  Medications to treat a UTI  Most UTIs are treated with antibiotics. These kill the bacteria. The length of time you need to take them depends on the type of infection. It may be as short as 3 days. If you have repeated UTIs, a low-dose antibiotic may be needed for several months. Take antibiotics  exactly as directed. Don t stop taking them until all of the medication is gone. If you stop taking the antibiotic too soon, the infection may not go away, and you may develop a resistance to the antibiotic. This can make it much harder to treat.  Lifestyle changes to treat and prevent UTIs  The lifestyle changes below will help get rid of your UTI. They may also help prevent future UTIs.    Drink plenty of fluids. This includes water, juice, or other caffeine-free drinks. Fluids help flush bacteria out of your body.    Empty your bladder. Always empty your bladder when you feel the urge to urinate. And always urinate before going to sleep. Urine that stays in your bladder can lead to infection. Try to urinate before and after sex as well.    Practice good personal hygiene. Wipe yourself from front to back after using the toilet. This helps keep bacteria from getting into the urethra.    Use condoms during sex. These help prevent UTIs caused by sexually transmitted bacteria. Also, avoid using spermicides during sex. These can increase the risk of UTIs. Choose other forms of birth control instead. For women who tend to get UTIs after sex, a low-dose of a preventive antibiotic may be used. Be sure to discuss this option with your health care provider.    Follow up with your health care provider as directed. He or she may test to make sure the infection has cleared. If necessary, additional treatment may be started.  Date Last Reviewed: 9/8/2014 2000-2016 [employer]. [address] [place] [address], [place] [address]. All rights reserved. This information is not intended as a substitute for professional medical care. Always follow your healthcare professional's instructions.

## 2021-09-20 ENCOUNTER — OFFICE VISIT (OUTPATIENT)
Dept: FAMILY MEDICINE | Facility: CLINIC | Age: 31
End: 2021-09-20
Payer: COMMERCIAL

## 2021-09-20 VITALS
WEIGHT: 169 LBS | DIASTOLIC BLOOD PRESSURE: 80 MMHG | SYSTOLIC BLOOD PRESSURE: 118 MMHG | BODY MASS INDEX: 27.28 KG/M2 | HEART RATE: 69 BPM | OXYGEN SATURATION: 98 % | RESPIRATION RATE: 16 BRPM

## 2021-09-20 DIAGNOSIS — N89.8 VAGINAL ITCHING: Primary | ICD-10-CM

## 2021-09-20 LAB
CLUE CELLS: ABNORMAL
TRICHOMONAS, WET PREP: ABNORMAL
WBC'S/HIGH POWER FIELD, WET PREP: ABNORMAL
YEAST, WET PREP: ABNORMAL

## 2021-09-20 PROCEDURE — 87210 SMEAR WET MOUNT SALINE/INK: CPT | Performed by: NURSE PRACTITIONER

## 2021-09-20 PROCEDURE — 99213 OFFICE O/P EST LOW 20 MIN: CPT | Performed by: NURSE PRACTITIONER

## 2021-09-20 RX ORDER — FLUCONAZOLE 150 MG/1
150 TABLET ORAL ONCE
Qty: 1 TABLET | Refills: 0 | Status: SHIPPED | OUTPATIENT
Start: 2021-09-20 | End: 2021-09-20

## 2021-09-20 NOTE — PATIENT INSTRUCTIONS
Complete your 3-day Monistat treatment.    If you are still having symptoms 24 hours after the last dose, fill the Diflucan tab prescription that I have provided for you here.    Give that tablet 3 days to work, and if it is not better after total of 1 week from today, please follow-up for a recheck.    Patient Education     Vaginal Infection: Yeast (Candidiasis)  Yeast infection occurs when yeast in the vagina increase and attacks the vaginal tissues. Yeast is a type of fungus. These infections are often caused by a type of yeast called Candida albicans. Other species of yeast can also cause infections. Factors that may make infection more likely include recent antibiotic use, douching, or increased sex. Yeast infections are more common in women who have diabetes, or are obese or pregnant, or have a weak immune system.   Symptoms of yeast infection    Clumpy or thin, white discharge, which may look like cottage cheese    No odor or minimal odor    Severe vaginal itching or burning    Burning with urination    Swelling, redness of vulva    Pain during sex    Treating yeast infection  Yeast infection is treated with a vaginal antifungal cream. In some cases, antifungal pills are prescribed instead. During treatment:     Finish all of your medicine, even if your symptoms go away.    Apply the cream before going to bed. Lie flat after applying so that it doesn't drip out.    Don't douche or use tampons.    Don't rely on a diaphragm or condoms, since the cream may weaken them.    Avoid intercourse if advised by your healthcare provider.  Should I treat a yeast infection myself?  Discuss with your healthcare provider whether you should use over-the-counter medicines to treat a yeast infection. Self-treatment may depend on whether:     You've had a yeast infection in the past.    You're at risk for sexually transmitted infections (STIs).  Call your healthcare provider if symptoms don't go away or come back after  treatment.   Lorena last reviewed this educational content on 4/1/2020 2000-2021 [employer]. All rights reserved. This information is not intended as a substitute for professional medical care. Always follow your healthcare professional's instructions.

## 2021-09-20 NOTE — PROGRESS NOTES
Assessment & Plan     Vaginal itching    - Wet prep - Clinic Collect  - fluconazole (DIFLUCAN) 150 MG tablet  Dispense: 1 tablet; Refill: 0     Typical vaginal yeast infection symptoms with recent 10-day course of antibiotic for UTI.  Wet prep ears negative, but once again, she is already taken 1 dose of Monistat which could have affected the wet prep.    Should complete her Monistat 3 that she already has.  Can  Diflucan tablet in 3 days if symptoms do not resolve with her over-the-counter product.  Should be rechecked in 1 week if not better.            Return in about 1 week (around 9/27/2021) for If no better.    Debbie Santos, [facility] [place]    Annika Baltazar is a 30 year old female who presents to clinic today for the following health issues:  Chief Complaint   Patient presents with     Vaginal Itching     x2-3d, swollen, just finished antiboitics for UTI recently     HPI    Patient was given 10 days of cephalexin for a UTI on 9/7.     Developed sensation of vaginal swelling with severe itching 2 to 3 days ago.    Took over-the-counter Monistat 3 product and used it for the first time yesterday.  Wet prep here was negative for yeast or BV.  Thinks her symptoms may be starting to improve since arriving, or severe yesterday.        Review of Systems  Denies dysuria, rash, other sx.       Objective    /80   Pulse 69   Resp 16   Wt 76.7 kg (169 lb)   LMP 08/26/2021 (Approximate)   SpO2 98%   BMI 27.28 kg/m    Physical Exam  Constitutional:       General: She is not in acute distress.     Appearance: She is well-developed.   Eyes:      General:         Right eye: No discharge.         Left eye: No discharge.      Conjunctiva/sclera: Conjunctivae normal.   Pulmonary:      Effort: Pulmonary effort is normal.   Genitourinary:     Comments: Mild erythema of vulva.  No tenderness nor obvious rash.  Musculoskeletal:         General: Normal range of motion.   Skin:      General: Skin is warm and dry.      Capillary Refill: Capillary refill takes less than 2 seconds.   Neurological:      Mental Status: She is alert and oriented to person, place, and time.   Psychiatric:         Mood and Affect: Mood normal.         Behavior: Behavior normal.         Thought Content: Thought content normal.         Judgment: Judgment normal.            Results for orders placed or performed in visit on 09/20/21 (from the past 24 hour(s))   Wet prep - Clinic Collect    Specimen: Vagina; Swab   Result Value Ref Range    Trichomonas Absent Absent    Yeast Absent Absent    Clue Cells Absent Absent    WBCs/high power field 1+ (A) None

## 2021-10-16 ENCOUNTER — HEALTH MAINTENANCE LETTER (OUTPATIENT)
Age: 31
End: 2021-10-16

## 2021-11-09 ENCOUNTER — OFFICE VISIT (OUTPATIENT)
Dept: FAMILY MEDICINE | Facility: CLINIC | Age: 31
End: 2021-11-09
Payer: COMMERCIAL

## 2021-11-09 VITALS
DIASTOLIC BLOOD PRESSURE: 68 MMHG | RESPIRATION RATE: 17 BRPM | WEIGHT: 155 LBS | OXYGEN SATURATION: 98 % | SYSTOLIC BLOOD PRESSURE: 118 MMHG | TEMPERATURE: 97.8 F | HEART RATE: 110 BPM | BODY MASS INDEX: 25.02 KG/M2

## 2021-11-09 DIAGNOSIS — J01.00 ACUTE NON-RECURRENT MAXILLARY SINUSITIS: Primary | ICD-10-CM

## 2021-11-09 PROCEDURE — 99213 OFFICE O/P EST LOW 20 MIN: CPT | Performed by: FAMILY MEDICINE

## 2021-11-09 NOTE — PROGRESS NOTES
S: Very pleasant 30-year-old female with past history of sinusitis presents today with 1 week history of axial facial pain, rhinorrhea and cough. When this began 1 week ago she also had a history of chills. Note: Patient states that she may be pregnant. ROS: Negative for rashes. Negative for current sore throat. Negative for vomiting or diarrhea. SH: Non-smoker    Meds: None    O: Blood pressure 118/68, temperature 97.8, pulse 110 respirations 17  NAD  HEENT-  --TMs-right normal, left inflamed  --Sclera and conjunctiva non-injected  --Pharynx non-erythematous  --No rhinorrhea  --Bilateral maxillofacial pain tenderness  Neck-  --Supple, no meningeal signs  --No cervical lymphadenopathy  Lungs--  --No adventitious sounds  Heart-  --Regular rate and rhythm  Skin-  --Pink and dry    A: Sinusitis    P: Augmentin 875 twice daily 14 days  Recommended nasal irrigation  Over-the-counter analgesics  Return if not improving

## 2022-01-13 ENCOUNTER — OFFICE VISIT (OUTPATIENT)
Dept: INTERNAL MEDICINE | Facility: CLINIC | Age: 32
End: 2022-01-13
Payer: COMMERCIAL

## 2022-01-13 VITALS
OXYGEN SATURATION: 98 % | BODY MASS INDEX: 28.52 KG/M2 | TEMPERATURE: 98.5 F | WEIGHT: 177.44 LBS | HEIGHT: 66 IN | SYSTOLIC BLOOD PRESSURE: 108 MMHG | HEART RATE: 78 BPM | DIASTOLIC BLOOD PRESSURE: 70 MMHG

## 2022-01-13 DIAGNOSIS — N39.0 RECURRENT UTI: ICD-10-CM

## 2022-01-13 DIAGNOSIS — Z00.00 ANNUAL PHYSICAL EXAM: Primary | ICD-10-CM

## 2022-01-13 PROCEDURE — 80061 LIPID PANEL: CPT | Performed by: NURSE PRACTITIONER

## 2022-01-13 PROCEDURE — 99213 OFFICE O/P EST LOW 20 MIN: CPT | Mod: 25 | Performed by: NURSE PRACTITIONER

## 2022-01-13 PROCEDURE — 36415 COLL VENOUS BLD VENIPUNCTURE: CPT | Performed by: NURSE PRACTITIONER

## 2022-01-13 PROCEDURE — 87624 HPV HI-RISK TYP POOLED RSLT: CPT | Performed by: NURSE PRACTITIONER

## 2022-01-13 PROCEDURE — G0123 SCREEN CERV/VAG THIN LAYER: HCPCS | Performed by: NURSE PRACTITIONER

## 2022-01-13 PROCEDURE — 99395 PREV VISIT EST AGE 18-39: CPT | Performed by: NURSE PRACTITIONER

## 2022-01-13 PROCEDURE — 82947 ASSAY GLUCOSE BLOOD QUANT: CPT | Performed by: NURSE PRACTITIONER

## 2022-01-13 RX ORDER — PSEUDOEPHEDRINE HCL 120 MG/1
120 TABLET, FILM COATED, EXTENDED RELEASE ORAL EVERY 12 HOURS
COMMUNITY
Start: 2022-01-08 | End: 2022-01-13

## 2022-01-13 RX ORDER — INHALER, ASSIST DEVICES
SPACER (EA) MISCELLANEOUS
COMMUNITY
Start: 2022-01-08 | End: 2022-01-13

## 2022-01-13 RX ORDER — NITROFURANTOIN 25; 75 MG/1; MG/1
100 CAPSULE ORAL DAILY PRN
Qty: 30 CAPSULE | Refills: 3 | Status: SHIPPED | OUTPATIENT
Start: 2022-01-13 | End: 2023-06-05

## 2022-01-13 RX ORDER — ALBUTEROL SULFATE 90 UG/1
1-2 AEROSOL, METERED RESPIRATORY (INHALATION)
COMMUNITY
Start: 2022-01-08 | End: 2022-01-13

## 2022-01-13 ASSESSMENT — MIFFLIN-ST. JEOR: SCORE: 1531.6

## 2022-01-13 NOTE — PROGRESS NOTES
Assessment/Plan:       Problem List Items Addressed This Visit        Urinary    Recurrent UTI     Try increasing fluids, urinating after intercourse. If she has a recurrence of UTI with these measures, she will need to be treated for the infection but then can start prophylactic post-coital antibiotic          Relevant Medications    nitroFURantoin macrocrystal-monohydrate (MACROBID) 100 MG capsule      Other Visit Diagnoses     Annual physical exam    -  Primary    Relevant Orders    Pap Screen with HPV - recommended age 30 - 65 years    Lipid panel reflex to direct LDL Non-fasting (Completed)    Glucose (Completed)    HPV High Risk Types DNA Cervical         - Reviewed the importance of monitoring for changes in breast appearance such as nipple inversion, changes inbreast tissue texture, non-healing wounds, or nipple discharge   - Plant-based diet and 150 minutes of physical activity per week recommended   - Health screenings and vaccines appropriate for age, biological gender,and risk factors reviewed and ordered as per this discussion   - She declines COVID-19 vaccination at this time         Subjective:     Giovanny Mendez is a 31 year old female who presents for an annual exam.     She is newly . She has had 4 UTIs since getting , confirmed with urine tests at various clinics. Trying preventive measures.     She is trying to conceive. Taking a prenatal when she remembers.     Answers for HPI/ROS submitted by the patient on 1/13/2022  Frequency of exercise:: 1 day/week  Getting at least 3 servings of Calcium per day:: NO  Diet:: Regular (no restrictions)  Taking medications regularly:: Yes  Medication side effects:: None  Bi-annual eye exam:: NO  Dental care twice a year:: NO  Sleep apnea or symptoms of sleep apnea:: None  Additional concerns today:: No  Duration of exercise:: Less than 15 minutes      Health Maintenance   Topic Date Due     ANNUAL REVIEW OF HM ORDERS  Never done     ADVANCE  CARE PLANNING  Never done     COVID-19 Vaccine (1) Never done     PAP  04/13/2020     INFLUENZA VACCINE (1) 09/01/2021     PREVENTIVE CARE VISIT  01/13/2023     DTAP/TDAP/TD IMMUNIZATION (3 - Td or Tdap) 04/17/2023     PHQ-2  Completed     MENINGITIS IMMUNIZATION  Completed     HEPATITIS B IMMUNIZATION  Completed     Pneumococcal Vaccine: Pediatrics (0 to 5 Years) and At-Risk Patients (6 to 64 Years)  Aged Out     IPV IMMUNIZATION  Aged Out     HEPATITIS C SCREENING  Discontinued     HIV SCREENING  Discontinued         Immunization History   Administered Date(s) Administered     Flu, Unspecified 12/02/2005, 12/02/2006, 10/25/2007, 10/19/2010, 09/24/2012     Hep B, Peds or Adolescent 08/14/2002, 08/22/2003, 08/30/2004     HepA-Adult 07/12/2017     Influenza (IIV3) PF 12/02/2005, 12/02/2006, 10/25/2007     Influenza Vaccine, 6+MO IM (QUADRIVALENT W/PRESERVATIVES) 09/17/2009     MMR 10/19/1999     Meningococcal (Menactra ) 07/09/2008     Td (Adult), Adsorbed 08/14/2002     Tdap (Adacel,Boostrix) 04/17/2013     Typhoid IM 07/12/2017       Gynecologic History  Patient's last menstrual period was 12/28/2021 (exact date).  Contraception: None   Last Pap: 4/13/17. Results were: NILM    OB History   No obstetric history on file.       Current Outpatient Medications   Medication Sig Dispense Refill     nitroFURantoin macrocrystal-monohydrate (MACROBID) 100 MG capsule Take 1 capsule (100 mg) by mouth daily as needed (after intercourse) 30 capsule 3     History reviewed. No pertinent past medical history.  History reviewed. No pertinent surgical history.  Patient has no known allergies.  Family History   Problem Relation Age of Onset     No Known Problems Mother      No Known Problems Father      No Known Problems Brother      Diabetes Maternal Grandfather      Social History     Socioeconomic History     Marital status: Single     Spouse name: Not on file     Number of children: Not on file     Years of education: Not on file  "    Highest education level: Not on file   Occupational History     Not on file   Tobacco Use     Smoking status: Never Smoker     Smokeless tobacco: Never Used   Substance and Sexual Activity     Alcohol use: Yes     Alcohol/week: 1.0 standard drink     Drug use: No     Sexual activity: Not Currently     Partners: Male   Other Topics Concern     Not on file   Social History Narrative     Not on file     Social Determinants of Health     Financial Resource Strain: Not on file   Food Insecurity: Not on file   Transportation Needs: Not on file   Physical Activity: Not on file   Stress: Not on file   Social Connections: Not on file   Intimate Partner Violence: Not on file   Housing Stability: Not on file         Objective:      Vitals:    01/13/22 1103   BP: 108/70   BP Location: Right arm   Patient Position: Sitting   Cuff Size: Adult Regular   Pulse: 78   Temp: 98.5  F (36.9  C)   TempSrc: Oral   SpO2: 98%   Weight: 80.5 kg (177 lb 7 oz)   Height: 1.676 m (5' 6\")     Wt Readings from Last 3 Encounters:   01/13/22 80.5 kg (177 lb 7 oz)   11/09/21 70.3 kg (155 lb)   09/20/21 76.7 kg (169 lb)     Body mass index is 28.64 kg/m . (>25?)    Physical Exam:  General Appearance: Alert, cooperative, no distress.  Eyes: PERRL, conjunctiva/corneas clear, EOM's intact  Ears: Normal TM's and external ear canals, both ears  Throat: Lips, mucosa, and tongue normal  Neck: Supple, symmetrical, trachea midline, no adenopathy;  thyroid: not enlarged, symmetric, no tenderness/mass/nodules  Lungs: Clear to auscultation bilaterally, respirations unlabored  Breasts: No breast masses, tenderness, asymmetry, or nipple discharge.  Heart: Regular rate and rhythm, S1 and S2 normal, no murmur, rub, or gallop  Abdomen: Soft, non-tender, bowel sounds active all four quadrants,  no masses, no organomegaly  Pelvic: Genital: EXTERNAL GENITALIA: Normal appearing vulva without masses, tenderness or lesions. PERINEUM: normal and intact. URETHRAL MEATUS: " normal VAGINA:  vagina with normal color and without discharge or lesions. CERVIX: normal appearing cervix without discharge or lesions. Non-friable. No CMT. UTERUS: uterus is normal size, shape, consistency, and non-tender. ADNEXA: no tenderness or fullness.   Extremities: Extremities normal, atraumatic, no cyanosis or edema  Skin: Skin color, texture, turgor normal, no rashes or lesions  Lymph nodes: Cervical, supraclavicular, and axillary nodes normal  Neurologic: Normal  Psych: Normal affect.  Does not appear anxious or depressed.

## 2022-01-14 PROBLEM — N39.0 RECURRENT UTI: Status: ACTIVE | Noted: 2022-01-14

## 2022-01-14 LAB
CHOLEST SERPL-MCNC: 186 MG/DL
FASTING STATUS PATIENT QL REPORTED: NO
FASTING STATUS PATIENT QL REPORTED: NO
GLUCOSE BLD-MCNC: 90 MG/DL (ref 70–125)
HDLC SERPL-MCNC: 52 MG/DL
LDLC SERPL CALC-MCNC: 116 MG/DL
TRIGL SERPL-MCNC: 90 MG/DL

## 2022-01-14 NOTE — ASSESSMENT & PLAN NOTE
Try increasing fluids, urinating after intercourse. If she has a recurrence of UTI with these measures, she will need to be treated for the infection but then can start prophylactic post-coital antibiotic

## 2022-01-17 LAB
HUMAN PAPILLOMA VIRUS 16 DNA: NEGATIVE
HUMAN PAPILLOMA VIRUS 18 DNA: NEGATIVE
HUMAN PAPILLOMA VIRUS FINAL DIAGNOSIS: NORMAL
HUMAN PAPILLOMA VIRUS OTHER HR: NEGATIVE

## 2022-01-19 LAB
BKR LAB AP GYN ADEQUACY: NORMAL
BKR LAB AP GYN INTERPRETATION: NORMAL
BKR LAB AP GYN OTHER FINDINGS: NORMAL
BKR LAB AP HPV REFLEX: NORMAL
BKR LAB AP LMP: NORMAL
BKR LAB AP PREVIOUS ABNORMAL: NORMAL
PATH REPORT.COMMENTS IMP SPEC: NORMAL
PATH REPORT.COMMENTS IMP SPEC: NORMAL
PATH REPORT.RELEVANT HX SPEC: NORMAL

## 2022-10-01 ENCOUNTER — HEALTH MAINTENANCE LETTER (OUTPATIENT)
Age: 32
End: 2022-10-01

## 2022-12-02 ENCOUNTER — TRANSFERRED RECORDS (OUTPATIENT)
Dept: HEALTH INFORMATION MANAGEMENT | Facility: CLINIC | Age: 32
End: 2022-12-02

## 2022-12-02 LAB
ALT SERPL-CCNC: 263 U/L (ref 6–29)
AST SERPL-CCNC: 123 U/L (ref 10–30)
CREATININE (EXTERNAL): 0.71 MG/DL (ref 0.5–0.97)
GFR ESTIMATED (EXTERNAL): 115 ML/MIN/1.73M2
GLUCOSE (EXTERNAL): 98 MG/DL (ref 65–99)
POTASSIUM (EXTERNAL): 4.1 MMOL/L (ref 3.5–5.3)

## 2022-12-08 ENCOUNTER — TRANSFERRED RECORDS (OUTPATIENT)
Dept: HEALTH INFORMATION MANAGEMENT | Facility: CLINIC | Age: 32
End: 2022-12-08

## 2022-12-09 ENCOUNTER — TRANSCRIBE ORDERS (OUTPATIENT)
Dept: OTHER | Age: 32
End: 2022-12-09

## 2022-12-09 DIAGNOSIS — K80.20 CHOLELITHIASIS: Primary | ICD-10-CM

## 2022-12-12 ENCOUNTER — DOCUMENTATION ONLY (OUTPATIENT)
Dept: GASTROENTEROLOGY | Facility: CLINIC | Age: 32
End: 2022-12-12

## 2022-12-12 NOTE — PROGRESS NOTES
Called [facility] to obtain past records pertaining to gallstones regarding a recent referral from Kassidy MINOR ([facility]).     Provided fax number.    Referring provider   Kassidy MINOR   [facility]   [address] [facility] [address]   Phone: [phone]  Fax: [phone]    SK

## 2022-12-16 NOTE — PROGRESS NOTES
Called [facility] to to follow-up on records request. Provided fax number again.     Referring provider   Kassidy MINOR   [facility]   [address] [place] [id number]   Phone: [phone]  Fax: [phone]     SK

## 2022-12-20 NOTE — PROGRESS NOTES
Received a call from Laura at the [facility], she stated that they had tried sending over records a few times.     I requested that they mail over records and images pertaining to Gallstones and the referral. Per their request, I faxed over written request with mailing address included.      Referring provider   Kassidy MINOR   [facility]   [address] [place] [id number]   Phone: [phone]  Fax: [phone]     SK

## 2022-12-21 ENCOUNTER — VIRTUAL VISIT (OUTPATIENT)
Dept: INTERNAL MEDICINE | Facility: CLINIC | Age: 32
End: 2022-12-21
Payer: COMMERCIAL

## 2022-12-21 DIAGNOSIS — R10.13 EPIGASTRIC PAIN: Primary | ICD-10-CM

## 2022-12-21 PROCEDURE — 99213 OFFICE O/P EST LOW 20 MIN: CPT | Mod: GT | Performed by: NURSE PRACTITIONER

## 2022-12-21 NOTE — PROGRESS NOTES
"Giovanny is a 32 year old who is being evaluated via a billable video visit.      How would you like to obtain your AVS? MyChart  If the video visit is dropped, the invitation should be resent by: Text to cell phone: [phone]  Will anyone else be joining your video visit? No      Assessment & Plan   Problem List Items Addressed This Visit    None  Visit Diagnoses     Epigastric pain    -  Primary    Relevant Orders    Hepatic panel (Albumin, ALT, AST, Bili, Alk Phos, TP)         Gastritis versus biliary colic?  Symptoms have resolved since she stopped drinking alcohol (1 month ago now) and were exclusively triggered by drinking alcohol.  However, she had an ultrasound showing gallstones and a slight elevation in her liver profile- report not available at this time.  We will plan to request the ultrasound and lab records today.  She will repeat a liver profile when she returns from [place] in January.  If symptoms recur, follow-up in the clinic for evaluation.  Consider surgical referral for biliary colic if she experiences recurrent symptoms and/or HIDA scan could be considered.       BMI:   Estimated body mass index is 28.64 kg/m  as calculated from the following:    Height as of 1/13/22: 1.676 m (5' 6\").    Weight as of 1/13/22: 80.5 kg (177 lb 7 oz).       Return in about 4 weeks (around 1/18/2023) for Follow up.    Kristie Diaz NP  [facility]    Annika Baltazar is a 32 year old, presenting for the following health issues:  Abdominal Pain (Gallbladder issues. Has stones. Unsure of next steps. Postpartum x2 months)      HPI   For the first month post-partum (delivered Sept 2022) she was having abdominal pain in the epigastric area after drinking alcohol. The pain only occurred when she drank alcohol, since she stopped drinking alcohol 1 month ago she hasn't had any recurrence of the pain. No nausea or vomiting. She has had fried/fatty foods without experiencing any pain. She denies " shoulder/back pain.  About 1 month ago she had an ultrasound completed through [facility] and was told that she had gallstones as well as slight elevation in her liver enzyme tests.         Objective           Vitals:  No vitals were obtained today due to virtual visit.    Physical Exam   GENERAL: Healthy, alert and no distress  EYES: Eyes grossly normal to inspection.  No discharge or erythema, or obvious scleral/conjunctival abnormalities.  RESP: No audible wheeze, cough, or visible cyanosis.  No visible retractions or increased work of breathing.    SKIN: Visible skin clear. No significant rash, abnormal pigmentation or lesions.  NEURO: Cranial nerves grossly intact.  Mentation and speech appropriate for age.  PSYCH: Mentation appears normal, affect normal/bright, judgement and insight intact, normal speech and appearance well-groomed.          Video-Visit Details    Type of service:  Video Visit     Originating Location (pt. Location): Home  Distant Location (provider location):  On-site  Platform used for Video Visit: Roopa

## 2022-12-22 ENCOUNTER — TELEPHONE (OUTPATIENT)
Dept: INTERNAL MEDICINE | Facility: CLINIC | Age: 32
End: 2022-12-22

## 2022-12-22 NOTE — TELEPHONE ENCOUNTER
Writer called clinic and spoke to an employee to request images and labs related to gallbladder over the past 2 months, clinic fax number was given, employee stated that they would be faxed over at their earliest conienience.

## 2022-12-28 ENCOUNTER — TRANSCRIBE ORDERS (OUTPATIENT)
Dept: OTHER | Age: 32
End: 2022-12-28

## 2022-12-28 DIAGNOSIS — R79.89 ELEVATED LFTS: Primary | ICD-10-CM

## 2022-12-28 DIAGNOSIS — K80.20 CHOLELITHIASIS: Primary | ICD-10-CM

## 2022-12-28 NOTE — PROGRESS NOTES
Call patient- I got the report from her OB office regarding the liver lab tests and ultrasound report. She did have a fairly significant elevation in the liver profile so I would like to recheck that lab test when she returns from her trip to [place] as well as some additional labs to evaluate for possible causes. The ultrasound also showed that she had some fat around the liver (common in pregnancy) which can cause some elevation in liver tests as well. The lab orders are in, please assist with scheduling a lab appointment. She should also let me know if she is experiencing a recurrence of the abdominal pain.

## 2022-12-30 ENCOUNTER — TELEPHONE (OUTPATIENT)
Dept: INTERNAL MEDICINE | Facility: CLINIC | Age: 32
End: 2022-12-30

## 2022-12-30 NOTE — TELEPHONE ENCOUNTER
Kristie Diaz, NP at 12/28/2022  8:57 AM    Status: Signed   Call patient- I got the report from her OB office regarding the liver lab tests and ultrasound report. She did have a fairly significant elevation in the liver profile so I would like to recheck that lab test when she returns from her trip to [place] as well as some additional labs to evaluate for possible causes. The ultrasound also showed that she had some fat around the liver (common in pregnancy) which can cause some elevation in liver tests as well. The lab orders are in, please assist with scheduling a lab appointment. She should also let me know if she is experiencing a recurrence of the abdominal pain.

## 2022-12-30 NOTE — TELEPHONE ENCOUNTER
Spoke with patient and relayed message below. She states that she did experience the abdominal pain while there in [place] and it lasted an hour. It was after drinking alcohol so she thinks that it is alcohol related.     Writer assisted with scheduling a lab appointment.

## 2023-01-10 ENCOUNTER — LAB (OUTPATIENT)
Dept: LAB | Facility: CLINIC | Age: 33
End: 2023-01-10
Payer: COMMERCIAL

## 2023-01-10 DIAGNOSIS — R10.13 EPIGASTRIC PAIN: ICD-10-CM

## 2023-01-10 DIAGNOSIS — R79.89 ELEVATED LFTS: ICD-10-CM

## 2023-01-10 LAB
ALBUMIN SERPL BCG-MCNC: 4.3 G/DL (ref 3.5–5.2)
ALP SERPL-CCNC: 100 U/L (ref 35–104)
ALT SERPL W P-5'-P-CCNC: 32 U/L (ref 10–35)
ANION GAP SERPL CALCULATED.3IONS-SCNC: 11 MMOL/L (ref 7–15)
AST SERPL W P-5'-P-CCNC: 24 U/L (ref 10–35)
BILIRUB DIRECT SERPL-MCNC: <0.2 MG/DL (ref 0–0.3)
BILIRUB SERPL-MCNC: 0.3 MG/DL
BUN SERPL-MCNC: 11.8 MG/DL (ref 6–20)
CALCIUM SERPL-MCNC: 9.6 MG/DL (ref 8.6–10)
CHLORIDE SERPL-SCNC: 104 MMOL/L (ref 98–107)
CREAT SERPL-MCNC: 0.77 MG/DL (ref 0.51–0.95)
DEPRECATED HCO3 PLAS-SCNC: 25 MMOL/L (ref 22–29)
ERYTHROCYTE [DISTWIDTH] IN BLOOD BY AUTOMATED COUNT: 12.9 % (ref 10–15)
FERRITIN SERPL-MCNC: 81 NG/ML (ref 6–175)
GFR SERPL CREATININE-BSD FRML MDRD: >90 ML/MIN/1.73M2
GLUCOSE SERPL-MCNC: 93 MG/DL (ref 70–99)
HCT VFR BLD AUTO: 38.6 % (ref 35–47)
HGB BLD-MCNC: 12.8 G/DL (ref 11.7–15.7)
MCH RBC QN AUTO: 29.5 PG (ref 26.5–33)
MCHC RBC AUTO-ENTMCNC: 33.2 G/DL (ref 31.5–36.5)
MCV RBC AUTO: 89 FL (ref 78–100)
PLATELET # BLD AUTO: 287 10E3/UL (ref 150–450)
POTASSIUM SERPL-SCNC: 3.9 MMOL/L (ref 3.4–5.3)
PROT SERPL-MCNC: 7.2 G/DL (ref 6.4–8.3)
RBC # BLD AUTO: 4.34 10E6/UL (ref 3.8–5.2)
SODIUM SERPL-SCNC: 140 MMOL/L (ref 136–145)
WBC # BLD AUTO: 7.1 10E3/UL (ref 4–11)

## 2023-01-10 PROCEDURE — 82248 BILIRUBIN DIRECT: CPT

## 2023-01-10 PROCEDURE — 85027 COMPLETE CBC AUTOMATED: CPT

## 2023-01-10 PROCEDURE — 82728 ASSAY OF FERRITIN: CPT

## 2023-01-10 PROCEDURE — 80053 COMPREHEN METABOLIC PANEL: CPT

## 2023-01-10 PROCEDURE — 36415 COLL VENOUS BLD VENIPUNCTURE: CPT

## 2023-01-16 DIAGNOSIS — K29.70 GASTRITIS WITHOUT BLEEDING, UNSPECIFIED CHRONICITY, UNSPECIFIED GASTRITIS TYPE: Primary | ICD-10-CM

## 2023-04-13 ENCOUNTER — APPOINTMENT (OUTPATIENT)
Dept: ULTRASOUND IMAGING | Facility: CLINIC | Age: 33
End: 2023-04-13
Attending: EMERGENCY MEDICINE
Payer: COMMERCIAL

## 2023-04-13 ENCOUNTER — HOSPITAL ENCOUNTER (EMERGENCY)
Facility: CLINIC | Age: 33
Discharge: HOME | End: 2023-04-13
Attending: EMERGENCY MEDICINE | Admitting: EMERGENCY MEDICINE
Payer: COMMERCIAL

## 2023-04-13 VITALS
TEMPERATURE: 97.6 F | DIASTOLIC BLOOD PRESSURE: 73 MMHG | BODY MASS INDEX: 27.44 KG/M2 | SYSTOLIC BLOOD PRESSURE: 124 MMHG | OXYGEN SATURATION: 100 % | HEART RATE: 70 BPM | RESPIRATION RATE: 18 BRPM | WEIGHT: 170 LBS

## 2023-04-13 DIAGNOSIS — K80.20 GALLSTONES: ICD-10-CM

## 2023-04-13 DIAGNOSIS — R74.01 TRANSAMINITIS: ICD-10-CM

## 2023-04-13 PROBLEM — O42.919 PRETERM PREMATURE RUPTURE OF MEMBRANES (PPROM) WITH UNKNOWN ONSET OF LABOR: Status: ACTIVE | Noted: 2022-09-26

## 2023-04-13 PROBLEM — O46.8X1 SUBCHORIONIC HEMATOMA IN FIRST TRIMESTER: Status: ACTIVE | Noted: 2022-09-26

## 2023-04-13 PROBLEM — O42.10 PROLONGED RUPTURE OF MEMBRANES, GREATER THAN 24 HOURS, DELIVERED, CURRENT HOSPITALIZATION: Status: ACTIVE | Noted: 2022-09-27

## 2023-04-13 PROBLEM — O41.8X10 SUBCHORIONIC HEMATOMA IN FIRST TRIMESTER: Status: ACTIVE | Noted: 2022-09-26

## 2023-04-13 LAB
ALBUMIN SERPL-MCNC: 3.7 G/DL (ref 3.5–5)
ALP SERPL-CCNC: 185 U/L (ref 45–120)
ALT SERPL W P-5'-P-CCNC: 738 U/L (ref 0–45)
ANION GAP SERPL CALCULATED.3IONS-SCNC: 9 MMOL/L (ref 5–18)
AST SERPL W P-5'-P-CCNC: 1013 U/L (ref 0–40)
BASOPHILS # BLD AUTO: 0 10E3/UL (ref 0–0.2)
BASOPHILS NFR BLD AUTO: 1 %
BILIRUB DIRECT SERPL-MCNC: 0.5 MG/DL
BILIRUB SERPL-MCNC: 1.1 MG/DL (ref 0–1)
BUN SERPL-MCNC: 14 MG/DL (ref 8–22)
CALCIUM SERPL-MCNC: 8.3 MG/DL (ref 8.5–10.5)
CHLORIDE BLD-SCNC: 106 MMOL/L (ref 98–107)
CO2 SERPL-SCNC: 24 MMOL/L (ref 22–31)
CREAT SERPL-MCNC: 0.72 MG/DL (ref 0.6–1.1)
EOSINOPHIL # BLD AUTO: 0.1 10E3/UL (ref 0–0.7)
EOSINOPHIL NFR BLD AUTO: 2 %
ERYTHROCYTE [DISTWIDTH] IN BLOOD BY AUTOMATED COUNT: 13.2 % (ref 10–15)
GFR SERPL CREATININE-BSD FRML MDRD: >90 ML/MIN/1.73M2
GLUCOSE BLD-MCNC: 103 MG/DL (ref 70–125)
HCG SERPL QL: NEGATIVE
HCT VFR BLD AUTO: 37.8 % (ref 35–47)
HGB BLD-MCNC: 12.5 G/DL (ref 11.7–15.7)
IMM GRANULOCYTES # BLD: 0 10E3/UL
IMM GRANULOCYTES NFR BLD: 0 %
LIPASE SERPL-CCNC: 39 U/L (ref 0–52)
LYMPHOCYTES # BLD AUTO: 1.3 10E3/UL (ref 0.8–5.3)
LYMPHOCYTES NFR BLD AUTO: 28 %
MAGNESIUM SERPL-MCNC: 2 MG/DL (ref 1.8–2.6)
MCH RBC QN AUTO: 28.9 PG (ref 26.5–33)
MCHC RBC AUTO-ENTMCNC: 33.1 G/DL (ref 31.5–36.5)
MCV RBC AUTO: 88 FL (ref 78–100)
MONOCYTES # BLD AUTO: 0.4 10E3/UL (ref 0–1.3)
MONOCYTES NFR BLD AUTO: 9 %
NEUTROPHILS # BLD AUTO: 2.9 10E3/UL (ref 1.6–8.3)
NEUTROPHILS NFR BLD AUTO: 60 %
NRBC # BLD AUTO: 0 10E3/UL
NRBC BLD AUTO-RTO: 0 /100
PLATELET # BLD AUTO: 237 10E3/UL (ref 150–450)
POTASSIUM BLD-SCNC: 4.2 MMOL/L (ref 3.5–5)
PROT SERPL-MCNC: 7.1 G/DL (ref 6–8)
RBC # BLD AUTO: 4.32 10E6/UL (ref 3.8–5.2)
SODIUM SERPL-SCNC: 139 MMOL/L (ref 136–145)
WBC # BLD AUTO: 4.7 10E3/UL (ref 4–11)

## 2023-04-13 PROCEDURE — 82248 BILIRUBIN DIRECT: CPT | Performed by: EMERGENCY MEDICINE

## 2023-04-13 PROCEDURE — 76705 ECHO EXAM OF ABDOMEN: CPT

## 2023-04-13 PROCEDURE — 96374 THER/PROPH/DIAG INJ IV PUSH: CPT

## 2023-04-13 PROCEDURE — 258N000003 HC RX IP 258 OP 636: Performed by: EMERGENCY MEDICINE

## 2023-04-13 PROCEDURE — 250N000011 HC RX IP 250 OP 636: Performed by: EMERGENCY MEDICINE

## 2023-04-13 PROCEDURE — 96375 TX/PRO/DX INJ NEW DRUG ADDON: CPT

## 2023-04-13 PROCEDURE — 83735 ASSAY OF MAGNESIUM: CPT | Performed by: EMERGENCY MEDICINE

## 2023-04-13 PROCEDURE — C9113 INJ PANTOPRAZOLE SODIUM, VIA: HCPCS | Performed by: EMERGENCY MEDICINE

## 2023-04-13 PROCEDURE — 36415 COLL VENOUS BLD VENIPUNCTURE: CPT | Performed by: EMERGENCY MEDICINE

## 2023-04-13 PROCEDURE — 99285 EMERGENCY DEPT VISIT HI MDM: CPT | Mod: 25

## 2023-04-13 PROCEDURE — 83690 ASSAY OF LIPASE: CPT | Performed by: EMERGENCY MEDICINE

## 2023-04-13 PROCEDURE — 84155 ASSAY OF PROTEIN SERUM: CPT | Performed by: EMERGENCY MEDICINE

## 2023-04-13 PROCEDURE — 80048 BASIC METABOLIC PNL TOTAL CA: CPT | Performed by: EMERGENCY MEDICINE

## 2023-04-13 PROCEDURE — 80074 ACUTE HEPATITIS PANEL: CPT | Performed by: EMERGENCY MEDICINE

## 2023-04-13 PROCEDURE — 96361 HYDRATE IV INFUSION ADD-ON: CPT

## 2023-04-13 PROCEDURE — 84703 CHORIONIC GONADOTROPIN ASSAY: CPT | Performed by: EMERGENCY MEDICINE

## 2023-04-13 PROCEDURE — 85025 COMPLETE CBC W/AUTO DIFF WBC: CPT | Performed by: EMERGENCY MEDICINE

## 2023-04-13 RX ORDER — MORPHINE SULFATE 4 MG/ML
4 INJECTION, SOLUTION INTRAMUSCULAR; INTRAVENOUS ONCE
Status: COMPLETED | OUTPATIENT
Start: 2023-04-13 | End: 2023-04-13

## 2023-04-13 RX ORDER — OXYCODONE HYDROCHLORIDE 5 MG/1
5 TABLET ORAL EVERY 6 HOURS PRN
Qty: 10 TABLET | Refills: 0 | Status: SHIPPED | OUTPATIENT
Start: 2023-04-13 | End: 2023-04-17

## 2023-04-13 RX ORDER — ONDANSETRON 4 MG/1
4 TABLET, ORALLY DISINTEGRATING ORAL EVERY 8 HOURS PRN
Qty: 20 TABLET | Refills: 0 | Status: SHIPPED | OUTPATIENT
Start: 2023-04-13 | End: 2023-06-05

## 2023-04-13 RX ORDER — ONDANSETRON 2 MG/ML
4 INJECTION INTRAMUSCULAR; INTRAVENOUS ONCE
Status: COMPLETED | OUTPATIENT
Start: 2023-04-13 | End: 2023-04-13

## 2023-04-13 RX ADMIN — FAMOTIDINE 20 MG: 10 INJECTION, SOLUTION INTRAVENOUS at 07:54

## 2023-04-13 RX ADMIN — MORPHINE SULFATE 4 MG: 4 INJECTION, SOLUTION INTRAMUSCULAR; INTRAVENOUS at 07:55

## 2023-04-13 RX ADMIN — ONDANSETRON 4 MG: 2 INJECTION INTRAMUSCULAR; INTRAVENOUS at 10:37

## 2023-04-13 RX ADMIN — PANTOPRAZOLE SODIUM 80 MG: 40 INJECTION, POWDER, FOR SOLUTION INTRAVENOUS at 07:54

## 2023-04-13 RX ADMIN — SODIUM CHLORIDE, POTASSIUM CHLORIDE, SODIUM LACTATE AND CALCIUM CHLORIDE 1000 ML: 600; 310; 30; 20 INJECTION, SOLUTION INTRAVENOUS at 07:53

## 2023-04-13 ASSESSMENT — ENCOUNTER SYMPTOMS
ABDOMINAL PAIN: 1
VOMITING: 0
NAUSEA: 0

## 2023-04-13 ASSESSMENT — ACTIVITIES OF DAILY LIVING (ADL): ADLS_ACUITY_SCORE: 35

## 2023-04-13 NOTE — DISCHARGE INSTRUCTIONS
Avoid high fat containing foods as these stimulate your gallbladder and could increase your pain.    As needed for pain control at home, take:  - over-the-counter ibuprofen 800mg by mouth every 8 hours (max dose 2400mg in 24 hours)  - over-the-counter acetaminophen 1g by mouth every 6 hours (max dose 4g in 24 hours)  - prescribed oxycodone for breakthrough pain    Use the Zofran for any nausea or vomiting.    Call General Surgery clinic to arrange follow up with them in the next week. An order has been placed for them to call you in 1-2 days as well.    Follow up with your Primary Care provider in 2 days for a recheck.    Return to the Emergency Department for any persistent vomiting, severe worsening, or any other concerns.

## 2023-04-13 NOTE — ED PROVIDER NOTES
"EMERGENCY DEPARTMENT ENCOUNTER      NAME: Giovanny Black  AGE: 32 year old female  YOB: 1990  MRN: [id number]  EVALUATION DATE & TIME: No admission date for patient encounter.    PCP: Kristie Diaz    ED PROVIDER: Edouard Puente M.D.      Chief Complaint   Patient presents with     Abdominal Pain         IMPRESSION  1. Gallstones    2. Transaminitis        PLAN  - NSAIDs, oxycodone, Zofran for symptoms  - avoid alcohol  - follow up hepatitis panel as outpatient  - close PCP & General Surgery follow up  - discharge to home    ED COURSE & MEDICAL DECISION MAKING    ED Course as of 04/13/23 1030   Thu Apr 13, 2023   0734 32yoF with history of gallstones, gastritis presenting for evaluation of epigastric pain. Reports 6 months of intermittent epigastric pain; usually at night after supper but usually resolves spontaneously. No nausea or vomiting. Thought it may be related to alcohol so stopped drinking & pain improved. Then saw \"a  doctor\" and has been taking gut supplements; restarted drinking alcohol without issues (denies daily use). Last night, ate hamburger helper & cake (birthday party) with no alcohol; had return of pain and has been constant since then. No chest pain, shortness of breath, pleuritic or exertional symptoms, urinary symptoms, fevers, sweats, chills, nausea, vomiting. Came for ongoing pain.    BP 140s/100s on presentation with otherwise normal vitals. Calm on exam with clear lungs, normal work of breathing, mild focal epigastric tenderness with no peritoneal signs, no RUQ tenderness with negative Newton's, no CVA tenderness, normal neuro exam.    No concern for acute aortic syndrome. Top differential is gallbladder etiology vs pancreatitis vs gastritis/PUD. Will obtain blood, US while giving IVF, morphine, Pepcid, Protonix for symptoms. Patient comfortable with this plan; no further questions at this time.   0922 Workup overall suggestive of passed gallstone.    US with " expected gallstones with no suggestion of cholecystitis and normal CBD. Labs notable for Tbili 1.1, alk phos 185, AST 1013, , normal lipase. Labs otherwise with normal WBC, no anemia, negative pregnancy, no FARTUN, no glaring electrolyte abnormality.    Patient with no pain on recheck; able to tolerate PO without difficulty. Doubt ongoing choledocholithiasis and certainly no concern for cholangitis. Patient states she only drinks 2-3 drinks per week and usually just one at a time; AST:ALT pattern consistent with alcoholic hepatitis but overall presentation more suggestive of gallstone etiology. Hepatitis panel sent as well although suspect this will be unremarkable. Patient comfortable with outpatient management; referral to General Surgery made. Patient comfortable with discharge at this time. Return precautions and need for PCP & General Surgery follow up discussed and understood. No further questions at the time of discharge.       --------------------------------------------------------------------------------   --------------------------------------------------------------------------------     7:29 AM  I met with the patient for the initial interview and physical examination. Discussed plan for treatment and workup in the ED.  9:12 AM I updated patient with lab and imaging results. Patient is feeling improved and is agreeable for discharge.      This patient involved a high degree of complexity in medical decision making, as significant risks were present and assessed. Recent encounters & results in medical record reviewed by me.    All workup (i.e. any EKG/labs/imaging as per charting below) reviewed and independently interpreted by me. See respective sections for details.    Broad differential considered for this patient presenting, including but not limited to:  Cholecystitis, choledocholithiasis, cholangitis, symptomatic cholelithiasis, pancreatitis, hepatitis, gastritis/PUD, perforated ulcer, SBO,  acute aortic syndrome    I wore the following PPE during this patient encounter:  N95 mask, face shield w/ eye protection, gloves      See additional MDM below if interested.      MEDICATIONS GIVEN IN THE EMERGENCY DEPARTMENT  Medications   lactated ringers BOLUS 1,000 mL (1,000 mLs Intravenous $New Bag 4/13/23 4682)   morphine (PF) injection 4 mg (4 mg Intravenous $Given 4/13/23 0211)   pantoprazole (PROTONIX) IV push injection 80 mg (80 mg Intravenous $Given 4/13/23 9152)   famotidine (PEPCID) injection 20 mg (20 mg Intravenous $Given 4/13/23 7724)       NEW PRESCRIPTIONS STARTED AT TODAY'S ER VISIT  Current Discharge Medication List      START taking these medications    Details   ondansetron (ZOFRAN ODT) 4 MG ODT tab Take 1 tablet (4 mg) by mouth every 8 hours as needed for nausea  Qty: 20 tablet, Refills: 0      oxyCODONE (ROXICODONE) 5 MG tablet Take 1 tablet (5 mg) by mouth every 6 hours as needed for pain  Qty: 10 tablet, Refills: 0         CONTINUE these medications which have NOT CHANGED    Details   nitroFURantoin macrocrystal-monohydrate (MACROBID) 100 MG capsule Take 1 capsule (100 mg) by mouth daily as needed (after intercourse)  Qty: 30 capsule, Refills: 3    Associated Diagnoses: Recurrent UTI      omeprazole (PRILOSEC) 20 MG DR capsule Take 1 capsule (20 mg) by mouth daily  Qty: 30 capsule, Refills: 0    Associated Diagnoses: Gastritis without bleeding, unspecified chronicity, unspecified gastritis type                 =================================================================      HPI  Patient information was obtained from: Patient     Use of : N/A         Giovanny Black is a 32 year old female with a pertinent history of cholelithiasis, recurrent UTIs, who presents to this ED via walk-in for evaluation of abdominal pain.    Patient reports she has had intermittent epigastric abdominal pain for the past 6 months. She did have an US in December of last year and was found to have  "gallstones. She states that she stopped drinking and began taking supplements for her \"thyroid and gut,\" and these factors seemed to resolve her pain. She did resume drinking after about 2 months, and had recurrence of her pain sometime shortly after this. She states that her pain typically presents around 10:30 PM, and is suspected to be associated with eating dinner. This pain typically resolves on its own. She presents to this ED after she had an \"intense\" episode of this pain last night which has persisted into today. She notes that she did not drink alcohol last night, and had hamburger helper and cake for dinner. She denies any nausea, vomiting, or any other complaints.      REVIEW OF SYSTEMS   Review of Systems   Gastrointestinal: Positive for abdominal pain (epigastric). Negative for nausea and vomiting.   All other systems reviewed and are negative.    All other systems reviewed and are negative except as noted above in HPI.        --------------- MEDICAL HISTORY ---------------  PAST MEDICAL HISTORY:  Reviewed independently by me.  History reviewed. No pertinent past medical history.  Patient Active Problem List   Diagnosis     Recurrent UTI     Dysfunctional labor     Nuchal cord with compression, delivered, current hospitalization      premature rupture of membranes (PPROM) with unknown onset of labor     Prolonged rupture of membranes, greater than 24 hours, delivered, current hospitalization     Prolonged second stage of labor, delivered     Subchorionic hematoma in first trimester     Vaginal delivery       PAST SURGICAL HISTORY:  Reviewed independently by me.  History reviewed. No pertinent surgical history.    CURRENT MEDICATIONS:    Reviewed independently by me.  No current facility-administered medications for this encounter.    Current Outpatient Medications:      ondansetron (ZOFRAN ODT) 4 MG ODT tab, Take 1 tablet (4 mg) by mouth every 8 hours as needed for nausea, Disp: 20 tablet, Rfl: " 0     oxyCODONE (ROXICODONE) 5 MG tablet, Take 1 tablet (5 mg) by mouth every 6 hours as needed for pain, Disp: 10 tablet, Rfl: 0     nitroFURantoin macrocrystal-monohydrate (MACROBID) 100 MG capsule, Take 1 capsule (100 mg) by mouth daily as needed (after intercourse), Disp: 30 capsule, Rfl: 3     omeprazole (PRILOSEC) 20 MG DR capsule, Take 1 capsule (20 mg) by mouth daily, Disp: 30 capsule, Rfl: 0    ALLERGIES:  Reviewed independently by me.  No Known Allergies    FAMILY HISTORY:  Reviewed independently by me.  Family History   Problem Relation Age of Onset     No Known Problems Mother      No Known Problems Father      No Known Problems Brother      Diabetes Maternal Grandfather        SOCIAL HISTORY:   Reviewed independently by me.  Social History     Socioeconomic History     Marital status:      Spouse name: None     Number of children: None     Years of education: None     Highest education level: None   Tobacco Use     Smoking status: Never     Smokeless tobacco: Never   Substance and Sexual Activity     Alcohol use: Yes     Alcohol/week: 1.0 standard drink of alcohol     Drug use: No     Sexual activity: Not Currently     Partners: Male       --------------- PHYSICAL EXAM ---------------  Nursing notes and vitals independently reviewed by me.  VITALS:  Vitals:    04/13/23 0719   BP: (!) 146/100   Pulse: 95   Resp: 16   Temp: 97.6  F (36.4  C)   TempSrc: Oral   SpO2: 100%   Weight: 77.1 kg (170 lb)       PHYSICAL EXAM:    General:  alert, interactive, no distress  Eyes:  conjunctivae clear, conjugate gaze  HENT:  atraumatic, nose with no rhinorrhea, oropharynx clear  Neck:  no meningismus  Cardiovascular:  HR 80s during exam, regular rhythm, no murmurs, brisk cap refill  Chest:  no chest wall tenderness  Pulmonary:  no stridor, normal phonation, normal work of breathing, clear lungs bilaterally  Abdomen:  soft, nondistended, mild focal epigastric tenderness, no focal RUQ tenderness, negative Newton's  sign  :  no CVA tenderness  Back:  no midline spinal tenderness  Musculoskeletal:  no pretibial edema, no calf tenderness. Gross ROM intact to joints of extremities with no obvious deformities.  Skin:  warm, dry, no rash  Neuro:  awake, alert, answers questions appropriately, follows commands, moves all limbs  Psych:  calm, normal affect      --------------- RESULTS ---------------  LAB:  Reviewed and independently interpreted by me.  Results for orders placed or performed during the hospital encounter of 04/13/23   US Abdomen Limited (RUQ)    Impression    IMPRESSION:  1.  Gallstones. No evidence for cholecystitis.  2.  Echogenic liver suggesting fatty infiltration.         Basic metabolic panel   Result Value Ref Range    Sodium 139 136 - 145 mmol/L    Potassium 4.2 3.5 - 5.0 mmol/L    Chloride 106 98 - 107 mmol/L    Carbon Dioxide (CO2) 24 22 - 31 mmol/L    Anion Gap 9 5 - 18 mmol/L    Urea Nitrogen 14 8 - 22 mg/dL    Creatinine 0.72 0.60 - 1.10 mg/dL    Calcium 8.3 (L) 8.5 - 10.5 mg/dL    Glucose 103 70 - 125 mg/dL    GFR Estimate >90 >60 mL/min/1.73m2   Hepatic function panel   Result Value Ref Range    Bilirubin Total 1.1 (H) 0.0 - 1.0 mg/dL    Bilirubin Direct 0.5 <=0.5 mg/dL    Protein Total 7.1 6.0 - 8.0 g/dL    Albumin 3.7 3.5 - 5.0 g/dL    Alkaline Phosphatase 185 (H) 45 - 120 U/L    AST 1,013 (H) 0 - 40 U/L     (H) 0 - 45 U/L   Result Value Ref Range    Lipase 39 0 - 52 U/L   Result Value Ref Range    Magnesium 2.0 1.8 - 2.6 mg/dL   hCG Qualitative Pregnancy   Result Value Ref Range    hCG Serum Qualitative Negative Negative   CBC with platelets and differential   Result Value Ref Range    WBC Count 4.7 4.0 - 11.0 10e3/uL    RBC Count 4.32 3.80 - 5.20 10e6/uL    Hemoglobin 12.5 11.7 - 15.7 g/dL    Hematocrit 37.8 35.0 - 47.0 %    MCV 88 78 - 100 fL    MCH 28.9 26.5 - 33.0 pg    MCHC 33.1 31.5 - 36.5 g/dL    RDW 13.2 10.0 - 15.0 %    Platelet Count 237 150 - 450 10e3/uL    % Neutrophils 60 %    %  Lymphocytes 28 %    % Monocytes 9 %    % Eosinophils 2 %    % Basophils 1 %    % Immature Granulocytes 0 %    NRBCs per 100 WBC 0 <1 /100    Absolute Neutrophils 2.9 1.6 - 8.3 10e3/uL    Absolute Lymphocytes 1.3 0.8 - 5.3 10e3/uL    Absolute Monocytes 0.4 0.0 - 1.3 10e3/uL    Absolute Eosinophils 0.1 0.0 - 0.7 10e3/uL    Absolute Basophils 0.0 0.0 - 0.2 10e3/uL    Absolute Immature Granulocytes 0.0 <=0.4 10e3/uL    Absolute NRBCs 0.0 10e3/uL       RADIOLOGY:  Reviewed and independently interpreted by me. Please see official radiology report.  Recent Results (from the past 24 hour(s))   US Abdomen Limited (RUQ)    Narrative    EXAM: US ABDOMEN LIMITED  LOCATION: [facility]  DATE/TIME: 4/13/2023 8:19 AM CDT    INDICATION: epigastric RUQ pain  COMPARISON: Abdominal ultrasound 12/08/2022  TECHNIQUE: Limited abdominal ultrasound.    FINDINGS:    GALLBLADDER: Several gallstones. No wall thickening or pericholecystic fluid. Negative sonographic Newton's sign.    BILE DUCTS: No biliary dilatation. The common duct measures 4 mm.    LIVER: Echogenic liver parenchyma suggesting fatty infiltration. No focal mass.    RIGHT KIDNEY: No hydronephrosis.    PANCREAS: The visualized portions are normal.    No ascites.      Impression    IMPRESSION:  1.  Gallstones. No evidence for cholecystitis.  2.  Echogenic liver suggesting fatty infiltration.                 --------------- ADDITIONAL MDM ---------------  History:  - Supplemental history from:       -- see above charting, if applicable: patient  - External Record(s) reviewed:       -- see above charting, if applicable       -- Inpatient/outpatient record (most recent clinic visit), prior imaging (prior RUQ US)    Workup:  - Chart documentation above includes differential considered and any EKGs or imaging independently interpreted by provider.  - In additional to work up documented, I considered the following work up:       -- see above charting, if  applicable    External Consultation:  - Discussion of management with another provider:       -- see above charting, if applicable    Complicating Factors:  - Care impacted by chronic illness:       -- see above MDM, past medical history, & problem list  - Care affected by social determinants of health:       -- see above social history    Disposition Considerations:  - Discharge       -- I considered admission given that the patient came to the Emergency Department, but ultimately discharged the patient per decision making above       -- I recommended the patient continue their current prescription strength medication(s) as charted above in current medications list       -- I prescribed prescription strength medication(s) as charted above       -- I recommended over-the-counter medication(s) as charted above & in discharge instructions         I, Luis Beauchamp, am serving as a scribe to document services personally performed by Dr. Edouard Puente based on my observation and the provider's statements to me. I, Edouard Puente MD attest that Luis Beauchamp is acting in a scribe capacity, has observed my performance of the services and has documented them in accordance with my direction.      Edouard Puente MD  04/13/23  Emergency Medicine  [facility] EMERGENCY ROOM  [address] [id number] [phone]  Dept: [phone]       Edouard Puente MD  04/13/23 1032

## 2023-04-13 NOTE — ED TRIAGE NOTES
Pt had her first vaginal delivery 6month ago.  At that time, she had pain in upper abd.  She states it has been on/off since then, but more regularly lately.  Denies NVCD.     Triage Assessment     Row Name 04/13/23 0719       Triage Assessment (Adult)    Airway WDL WDL       Respiratory WDL    Respiratory WDL WDL       Skin Circulation/Temperature WDL    Skin Circulation/Temperature WDL WDL       Cardiac WDL    Cardiac WDL WDL       Peripheral/Neurovascular WDL    Peripheral Neurovascular WDL WDL       Cognitive/Neuro/Behavioral WDL    Cognitive/Neuro/Behavioral WDL WDL

## 2023-04-14 LAB
HAV IGM SERPL QL IA: NONREACTIVE
HBV CORE IGM SERPL QL IA: NONREACTIVE
HBV SURFACE AG SERPL QL IA: NONREACTIVE
HCV AB SERPL QL IA: NONREACTIVE

## 2023-04-15 ENCOUNTER — NURSE TRIAGE (OUTPATIENT)
Dept: NURSING | Facility: CLINIC | Age: 33
End: 2023-04-15
Payer: COMMERCIAL

## 2023-04-15 NOTE — TELEPHONE ENCOUNTER
Nurse Triage SBAR    Situation:   -Upper abdominal pain    Background:   -Patient calling, It is okay to leave a detailed message at this number.     Assessment:   -gull bladder pain  -went to the ED two days ago (thusday), was sent home and told to follow up with GI  -pain is now 6/10 now  -pain started agina this AM and has been consistent since then    Recommendation:   Go to ED per you ED AVS:  Take the prescribed pain medications per your AVS:       ADAM SAEED RN on 4/15/2023 at 12:55 PM      Reason for Disposition    [1] MILD-MODERATE pain AND [2] constant AND [3] present > 2 hours    Additional Information    Negative: Shock suspected (e.g., cold/pale/clammy skin, too weak to stand, low BP, rapid pulse)    Negative: Passed out (i.e., lost consciousness, collapsed and was not responding)    Negative: Difficult to awaken or acting confused (e.g., disoriented, slurred speech)    Negative: SEVERE difficulty breathing (e.g., struggling for each breath, speaks in single words)    Negative: Visible sweat on face or sweat dripping down face    Negative: Sounds like a life-threatening emergency to the triager    Negative: Followed an abdomen (stomach) injury    Negative: Chest pain    Negative: [1] SEVERE pain (e.g., excruciating) AND [2] present > 1 hour    Negative: [1] Pain lasts > 10 minutes AND [2] age > 50    Negative: [1] Pain lasts > 10 minutes AND [2] age > 40 AND [3] associated chest, arm, neck, upper back or jaw pain    Negative: [1] Pain lasts > 10 minutes AND [2] age > 35 AND [3] at least one cardiac risk factor (i.e., hypertension, diabetes, obesity, smoker or strong family history of heart disease)    Negative: [1] Pain lasts > 10 minutes AND [2] history of heart disease (i.e., heart attack, bypass surgery, angina, angioplasty, CHF; not just a heart murmur)    Negative: [1] Pain lasts > 10 minutes AND [2] difficulty breathing    Negative: [1] Vomiting AND [2] contains red blood  (Exception: few  "streaks and only occurred once)    Negative: [1] Vomiting AND [2] contains black (\"coffee ground\") material    Negative: Blood in bowel movements  (Exception: Blood on surface of BM with constipation)    Negative: Black or tarry bowel movements (Exception: chronic-unchanged black-grey bowel movements AND is taking iron pills or Pepto-bismol)    Negative: [1] Pregnant 20 or more weeks AND [2] new hand or face swelling    Negative: Patient sounds very sick or weak to the triager    Protocols used: ABDOMINAL PAIN - UPPER-A-AH      "

## 2023-04-20 ENCOUNTER — OFFICE VISIT (OUTPATIENT)
Dept: SURGERY | Facility: CLINIC | Age: 33
End: 2023-04-20
Payer: COMMERCIAL

## 2023-04-20 VITALS — WEIGHT: 180 LBS | BODY MASS INDEX: 28.93 KG/M2 | HEIGHT: 66 IN

## 2023-04-20 DIAGNOSIS — K80.50 BILIARY COLIC: Primary | ICD-10-CM

## 2023-04-20 PROCEDURE — 99204 OFFICE O/P NEW MOD 45 MIN: CPT | Performed by: SURGERY

## 2023-04-20 NOTE — H&P (VIEW-ONLY)
History:   Giovanny Black is a 32 year old female referred to general surgery by Dr. Puente for cholelithiasis.  She is currently 7 months postpartum.  Since delivering her daughter she has been having intermittent epigastric abdominal pains.  She describes them as sharp.  The pain will last 1 to 2 hours.  It usually does not radiate anywhere.  It tends to occur as she is trying to go to bed at night.  She has not really been having any other symptoms along with it such as fever, chills, nausea, vomiting, gassiness, bloating, or heartburn.  She has not had any color change in her urine or stools to suggest jaundice.  She has been evaluated a couple times and she has been noted to have intermittent elevated liver enzymes.  She had started taking some but, thyroid, and liver supplements.  These may have been helping with her symptoms, but maybe not.  Since having her daughter she has intermittently had a few alcoholic beverages, but no binges.    Allergies:  Patient has no known allergies.    Past medical history:  Denies    Past surgical history:  None    Current medications:     nitroFURantoin macrocrystal-monohydrate (MACROBID) 100 MG capsule, Take 1 capsule (100 mg) by mouth daily as needed (after intercourse) (Patient not taking: Reported on 4/20/2023), Disp: 30 capsule, Rfl: 3     omeprazole (PRILOSEC) 20 MG DR capsule, Take 1 capsule (20 mg) by mouth daily (Patient not taking: Reported on 4/20/2023), Disp: 30 capsule, Rfl: 0     ondansetron (ZOFRAN ODT) 4 MG ODT tab, Take 1 tablet (4 mg) by mouth every 8 hours as needed for nausea (Patient not taking: Reported on 4/20/2023), Disp: 20 tablet, Rfl: 0    Family history:  No known family history of anesthesia problems    Social history:  Rarely consumes alcohol.  Denies tobacco and illicit drug use.  She used to be a  before she had her daughter, Alberta.  Now she is at home with her daughter.    Review of Systems:  General: No complaints or  "constitutional symptoms  Skin: No complaints or symptoms   Hematologic/Lymphatic: No symptoms or complaints  Psychiatric: No symptoms or complaints  Endocrine: No excessive fatigue, no hypermetabolic symptoms reported  Respiratory: No cough, shortness of breath, or wheezing  Cardiovascular: No chest pain or dyspnea on exertion  Gastrointestinal: As per HPI  Musculoskeletal: No recent injuries reported  Neurological: No focal neurologic defects reported.      Exam:  Ht 1.676 m (5' 6\")   Wt 81.6 kg (180 lb)   LMP 04/12/2023   Breastfeeding No   BMI 29.05 kg/m    Body mass index is 29.05 kg/m .  General: Alert, cooperative, appears stated age   Skin: Skin color, texture, turgor normal, no rashes or lesions   Lymphatic: No obvious adenopathy, no swelling   Eyes: No scleral icterus, pupils equal  HENT: No traumatic injury to the head or face, no gross abnormalities  Lungs: Normal respiratory effort, breath sounds equal bilaterally  Heart: Regular rate and rhythm  Abdomen: Soft, nondistended, nontender to palpation  Musculoskeletal: No obvious swelling or deformities  Neurologic: Grossly intact      Imaging:   Pertinent images personally reviewed by myself and discussed with the patient.    Radiology reports:  EXAM: US ABDOMEN LIMITED  LOCATION: [facility]  DATE/TIME: 4/13/2023 8:19 AM CDT     INDICATION: epigastric RUQ pain  COMPARISON: Abdominal ultrasound 12/08/2022  TECHNIQUE: Limited abdominal ultrasound.     FINDINGS:  GALLBLADDER: Several gallstones. No wall thickening or pericholecystic fluid. Negative sonographic Newton's sign.  BILE DUCTS: No biliary dilatation. The common duct measures 4 mm.  LIVER: Echogenic liver parenchyma suggesting fatty infiltration. No focal mass.  RIGHT KIDNEY: No hydronephrosis.  PANCREAS: The visualized portions are normal.  No ascites.                                                                   IMPRESSION:  1.  Gallstones. No evidence for " cholecystitis.  2.  Echogenic liver suggesting fatty infiltration.    My interpretation:  Multiple small stones seen within the gallbladder.    Assessment/Plan:   Giovanny Black is a 32 year old female with signs and symptoms consistent with biliary colic.  With the elevation of her liver enzymes last week, I am suspecting that she passed a small stone.  She did not have any biliary dilation on her ultrasound.  I have explained the pathophysiology of gallbladder disease in detail as well as the surgical versus non-operative management strategies.  The risks of surgery and anesthesia include, but are not limited to, bleeding, infection, injury to surrounding structures, the need to convert to an open procedure, blood clots, stroke, heart attack and death.  Specifically we discussed the risk of damage to the common bile duct and hepatic vessels, and the complications that may arise from damage to these structures.  Additionally, the risks of non-operative management were discussed which include, but are not limited to, infection, recurrent pain, sepsis and death.     She understands everything which was discussed and is interested in pursuing surgical management.  Therefore, preoperative orders have been placed for laparoscopic cholecystectomy under general anesthesia.  Postoperative recovery and restrictions were discussed.  She would have a 20 pound lifting restriction for 2 weeks.  She will work with my surgery scheduler to pick a date.     Pinky Le DO  General Surgeon  [facility]  Surgery [address]  [place] [address]  Office: [phone]  Employed by - [facility] [employer] [facility] [employer]

## 2023-04-20 NOTE — PROGRESS NOTES
History:   Giovanny Black is a 32 year old female referred to general surgery by Dr. Puente for cholelithiasis.  She is currently 7 months postpartum.  Since delivering her daughter she has been having intermittent epigastric abdominal pains.  She describes them as sharp.  The pain will last 1 to 2 hours.  It usually does not radiate anywhere.  It tends to occur as she is trying to go to bed at night.  She has not really been having any other symptoms along with it such as fever, chills, nausea, vomiting, gassiness, bloating, or heartburn.  She has not had any color change in her urine or stools to suggest jaundice.  She has been evaluated a couple times and she has been noted to have intermittent elevated liver enzymes.  She had started taking some but, thyroid, and liver supplements.  These may have been helping with her symptoms, but maybe not.  Since having her daughter she has intermittently had a few alcoholic beverages, but no binges.    Allergies:  Patient has no known allergies.    Past medical history:  Denies    Past surgical history:  None    Current medications:     nitroFURantoin macrocrystal-monohydrate (MACROBID) 100 MG capsule, Take 1 capsule (100 mg) by mouth daily as needed (after intercourse) (Patient not taking: Reported on 4/20/2023), Disp: 30 capsule, Rfl: 3     omeprazole (PRILOSEC) 20 MG DR capsule, Take 1 capsule (20 mg) by mouth daily (Patient not taking: Reported on 4/20/2023), Disp: 30 capsule, Rfl: 0     ondansetron (ZOFRAN ODT) 4 MG ODT tab, Take 1 tablet (4 mg) by mouth every 8 hours as needed for nausea (Patient not taking: Reported on 4/20/2023), Disp: 20 tablet, Rfl: 0    Family history:  No known family history of anesthesia problems    Social history:  Rarely consumes alcohol.  Denies tobacco and illicit drug use.  She used to be a  before she had her daughter, Alberta.  Now she is at home with her daughter.    Review of Systems:  General: No complaints or  "constitutional symptoms  Skin: No complaints or symptoms   Hematologic/Lymphatic: No symptoms or complaints  Psychiatric: No symptoms or complaints  Endocrine: No excessive fatigue, no hypermetabolic symptoms reported  Respiratory: No cough, shortness of breath, or wheezing  Cardiovascular: No chest pain or dyspnea on exertion  Gastrointestinal: As per HPI  Musculoskeletal: No recent injuries reported  Neurological: No focal neurologic defects reported.      Exam:  Ht 1.676 m (5' 6\")   Wt 81.6 kg (180 lb)   LMP 04/12/2023   Breastfeeding No   BMI 29.05 kg/m    Body mass index is 29.05 kg/m .  General: Alert, cooperative, appears stated age   Skin: Skin color, texture, turgor normal, no rashes or lesions   Lymphatic: No obvious adenopathy, no swelling   Eyes: No scleral icterus, pupils equal  HENT: No traumatic injury to the head or face, no gross abnormalities  Lungs: Normal respiratory effort, breath sounds equal bilaterally  Heart: Regular rate and rhythm  Abdomen: Soft, nondistended, nontender to palpation  Musculoskeletal: No obvious swelling or deformities  Neurologic: Grossly intact      Imaging:   Pertinent images personally reviewed by myself and discussed with the patient.    Radiology reports:  EXAM: US ABDOMEN LIMITED  LOCATION: [facility]  DATE/TIME: 4/13/2023 8:19 AM CDT     INDICATION: epigastric RUQ pain  COMPARISON: Abdominal ultrasound 12/08/2022  TECHNIQUE: Limited abdominal ultrasound.     FINDINGS:  GALLBLADDER: Several gallstones. No wall thickening or pericholecystic fluid. Negative sonographic Newton's sign.  BILE DUCTS: No biliary dilatation. The common duct measures 4 mm.  LIVER: Echogenic liver parenchyma suggesting fatty infiltration. No focal mass.  RIGHT KIDNEY: No hydronephrosis.  PANCREAS: The visualized portions are normal.  No ascites.                                                                   IMPRESSION:  1.  Gallstones. No evidence for " cholecystitis.  2.  Echogenic liver suggesting fatty infiltration.    My interpretation:  Multiple small stones seen within the gallbladder.    Assessment/Plan:   Giovanny Black is a 32 year old female with signs and symptoms consistent with biliary colic.  With the elevation of her liver enzymes last week, I am suspecting that she passed a small stone.  She did not have any biliary dilation on her ultrasound.  I have explained the pathophysiology of gallbladder disease in detail as well as the surgical versus non-operative management strategies.  The risks of surgery and anesthesia include, but are not limited to, bleeding, infection, injury to surrounding structures, the need to convert to an open procedure, blood clots, stroke, heart attack and death.  Specifically we discussed the risk of damage to the common bile duct and hepatic vessels, and the complications that may arise from damage to these structures.  Additionally, the risks of non-operative management were discussed which include, but are not limited to, infection, recurrent pain, sepsis and death.     She understands everything which was discussed and is interested in pursuing surgical management.  Therefore, preoperative orders have been placed for laparoscopic cholecystectomy under general anesthesia.  Postoperative recovery and restrictions were discussed.  She would have a 20 pound lifting restriction for 2 weeks.  She will work with my surgery scheduler to pick a date.     Pinky Le DO  General Surgeon  [facility]  Surgery [address]  [place] [address]  Office: [phone]  Employed by - [employer]

## 2023-04-20 NOTE — LETTER
4/20/2023         RE: Giovanny Black  [address] [facility] [address]        Dear Colleague,    Thank you for referring your patient, Giovanny Black, to the [facility] [place]. Please see a copy of my visit note below.    History:   Giovanny Black is a 32 year old female referred to general surgery by Dr. Puente for cholelithiasis.  She is currently 7 months postpartum.  Since delivering her daughter she has been having intermittent epigastric abdominal pains.  She describes them as sharp.  The pain will last 1 to 2 hours.  It usually does not radiate anywhere.  It tends to occur as she is trying to go to bed at night.  She has not really been having any other symptoms along with it such as fever, chills, nausea, vomiting, gassiness, bloating, or heartburn.  She has not had any color change in her urine or stools to suggest jaundice.  She has been evaluated a couple times and she has been noted to have intermittent elevated liver enzymes.  She had started taking some but, thyroid, and liver supplements.  These may have been helping with her symptoms, but maybe not.  Since having her daughter she has intermittently had a few alcoholic beverages, but no binges.    Allergies:  Patient has no known allergies.    Past medical history:  Denies    Past surgical history:  None    Current medications:     nitroFURantoin macrocrystal-monohydrate (MACROBID) 100 MG capsule, Take 1 capsule (100 mg) by mouth daily as needed (after intercourse) (Patient not taking: Reported on 4/20/2023), Disp: 30 capsule, Rfl: 3     omeprazole (PRILOSEC) 20 MG DR capsule, Take 1 capsule (20 mg) by mouth daily (Patient not taking: Reported on 4/20/2023), Disp: 30 capsule, Rfl: 0     ondansetron (ZOFRAN ODT) 4 MG ODT tab, Take 1 tablet (4 mg) by mouth every 8 hours as needed for nausea (Patient not taking: Reported on 4/20/2023), Disp: 20 tablet, Rfl: 0    Family history:  No known  "family history of anesthesia problems    Social history:  Rarely consumes alcohol.  Denies tobacco and illicit drug use.  She used to be a  before she had her daughter, Alberta.  Now she is at home with her daughter.    Review of Systems:  General: No complaints or constitutional symptoms  Skin: No complaints or symptoms   Hematologic/Lymphatic: No symptoms or complaints  Psychiatric: No symptoms or complaints  Endocrine: No excessive fatigue, no hypermetabolic symptoms reported  Respiratory: No cough, shortness of breath, or wheezing  Cardiovascular: No chest pain or dyspnea on exertion  Gastrointestinal: As per HPI  Musculoskeletal: No recent injuries reported  Neurological: No focal neurologic defects reported.      Exam:  Ht 1.676 m (5' 6\")   Wt 81.6 kg (180 lb)   LMP 04/12/2023   Breastfeeding No   BMI 29.05 kg/m    Body mass index is 29.05 kg/m .  General: Alert, cooperative, appears stated age   Skin: Skin color, texture, turgor normal, no rashes or lesions   Lymphatic: No obvious adenopathy, no swelling   Eyes: No scleral icterus, pupils equal  HENT: No traumatic injury to the head or face, no gross abnormalities  Lungs: Normal respiratory effort, breath sounds equal bilaterally  Heart: Regular rate and rhythm  Abdomen: Soft, nondistended, nontender to palpation  Musculoskeletal: No obvious swelling or deformities  Neurologic: Grossly intact      Imaging:   Pertinent images personally reviewed by myself and discussed with the patient.    Radiology reports:  EXAM: US ABDOMEN LIMITED  LOCATION: [facility]  DATE/TIME: 4/13/2023 8:19 AM CDT     INDICATION: epigastric RUQ pain  COMPARISON: Abdominal ultrasound 12/08/2022  TECHNIQUE: Limited abdominal ultrasound.     FINDINGS:  GALLBLADDER: Several gallstones. No wall thickening or pericholecystic fluid. Negative sonographic Newton's sign.  BILE DUCTS: No biliary dilatation. The common duct measures 4 mm.  LIVER: " Echogenic liver parenchyma suggesting fatty infiltration. No focal mass.  RIGHT KIDNEY: No hydronephrosis.  PANCREAS: The visualized portions are normal.  No ascites.                                                                   IMPRESSION:  1.  Gallstones. No evidence for cholecystitis.  2.  Echogenic liver suggesting fatty infiltration.    My interpretation:  Multiple small stones seen within the gallbladder.    Assessment/Plan:   Giovanny Black is a 32 year old female with signs and symptoms consistent with biliary colic.  With the elevation of her liver enzymes last week, I am suspecting that she passed a small stone.  She did not have any biliary dilation on her ultrasound.  I have explained the pathophysiology of gallbladder disease in detail as well as the surgical versus non-operative management strategies.  The risks of surgery and anesthesia include, but are not limited to, bleeding, infection, injury to surrounding structures, the need to convert to an open procedure, blood clots, stroke, heart attack and death.  Specifically we discussed the risk of damage to the common bile duct and hepatic vessels, and the complications that may arise from damage to these structures.  Additionally, the risks of non-operative management were discussed which include, but are not limited to, infection, recurrent pain, sepsis and death.     She understands everything which was discussed and is interested in pursuing surgical management.  Therefore, preoperative orders have been placed for laparoscopic cholecystectomy under general anesthesia.  Postoperative recovery and restrictions were discussed.  She would have a 20 pound lifting restriction for 2 weeks.  She will work with my surgery scheduler to pick a date.     Pinky Le DO  General Surgeon  [facility]  Surgery [address]  [place] [address]  Office: [phone]  Employed by [facility] [employer] [facility]  Health Services        Again, thank you for allowing me to participate in the care of your patient.        Sincerely,        Pinky Le, DO

## 2023-05-11 ENCOUNTER — ANESTHESIA EVENT (OUTPATIENT)
Dept: SURGERY | Facility: AMBULATORY SURGERY CENTER | Age: 33
End: 2023-05-11
Payer: COMMERCIAL

## 2023-05-12 ENCOUNTER — HOSPITAL ENCOUNTER (OUTPATIENT)
Facility: AMBULATORY SURGERY CENTER | Age: 33
Discharge: HOME | End: 2023-05-12
Attending: SURGERY
Payer: COMMERCIAL

## 2023-05-12 ENCOUNTER — ANESTHESIA (OUTPATIENT)
Dept: SURGERY | Facility: AMBULATORY SURGERY CENTER | Age: 33
End: 2023-05-12
Payer: COMMERCIAL

## 2023-05-12 VITALS
HEIGHT: 66 IN | HEART RATE: 55 BPM | RESPIRATION RATE: 16 BRPM | TEMPERATURE: 96.8 F | BODY MASS INDEX: 28.12 KG/M2 | WEIGHT: 175 LBS | SYSTOLIC BLOOD PRESSURE: 109 MMHG | OXYGEN SATURATION: 96 % | DIASTOLIC BLOOD PRESSURE: 74 MMHG

## 2023-05-12 DIAGNOSIS — G89.18 POSTOPERATIVE PAIN: Primary | ICD-10-CM

## 2023-05-12 DIAGNOSIS — K80.50 BILIARY COLIC: ICD-10-CM

## 2023-05-12 LAB
HCG UR QL: NEGATIVE
INTERNAL QC OK POCT: NORMAL
POCT KIT EXPIRATION DATE: NORMAL
POCT KIT LOT NUMBER: NORMAL

## 2023-05-12 PROCEDURE — 47562 LAPAROSCOPIC CHOLECYSTECTOMY: CPT | Performed by: SURGERY

## 2023-05-12 RX ORDER — HYDROCODONE BITARTRATE AND ACETAMINOPHEN 5; 325 MG/1; MG/1
1 TABLET ORAL EVERY 4 HOURS PRN
Qty: 12 TABLET | Refills: 0 | Status: SHIPPED | OUTPATIENT
Start: 2023-05-12 | End: 2023-06-15

## 2023-05-12 RX ORDER — SODIUM CHLORIDE, SODIUM LACTATE, POTASSIUM CHLORIDE, CALCIUM CHLORIDE 600; 310; 30; 20 MG/100ML; MG/100ML; MG/100ML; MG/100ML
INJECTION, SOLUTION INTRAVENOUS CONTINUOUS
Status: DISCONTINUED | OUTPATIENT
Start: 2023-05-12 | End: 2023-05-13 | Stop reason: HOSPADM

## 2023-05-12 RX ORDER — ONDANSETRON 4 MG/1
4 TABLET, ORALLY DISINTEGRATING ORAL EVERY 30 MIN PRN
Status: DISCONTINUED | OUTPATIENT
Start: 2023-05-12 | End: 2023-05-13 | Stop reason: HOSPADM

## 2023-05-12 RX ORDER — NEOSTIGMINE METHYLSULFATE 1 MG/ML
VIAL (ML) INJECTION PRN
Status: DISCONTINUED | OUTPATIENT
Start: 2023-05-12 | End: 2023-05-12

## 2023-05-12 RX ORDER — LIDOCAINE HYDROCHLORIDE 20 MG/ML
INJECTION, SOLUTION INFILTRATION; PERINEURAL PRN
Status: DISCONTINUED | OUTPATIENT
Start: 2023-05-12 | End: 2023-05-12

## 2023-05-12 RX ORDER — PROPOFOL 10 MG/ML
INJECTION, EMULSION INTRAVENOUS PRN
Status: DISCONTINUED | OUTPATIENT
Start: 2023-05-12 | End: 2023-05-12

## 2023-05-12 RX ORDER — BUPIVACAINE HYDROCHLORIDE AND EPINEPHRINE 2.5; 5 MG/ML; UG/ML
INJECTION, SOLUTION INFILTRATION; PERINEURAL PRN
Status: DISCONTINUED | OUTPATIENT
Start: 2023-05-12 | End: 2023-05-12 | Stop reason: HOSPADM

## 2023-05-12 RX ORDER — FENTANYL CITRATE 0.05 MG/ML
25 INJECTION, SOLUTION INTRAMUSCULAR; INTRAVENOUS
Status: DISCONTINUED | OUTPATIENT
Start: 2023-05-12 | End: 2023-05-13 | Stop reason: HOSPADM

## 2023-05-12 RX ORDER — MAGNESIUM SULFATE HEPTAHYDRATE 40 MG/ML
4 INJECTION, SOLUTION INTRAVENOUS ONCE
Status: COMPLETED | OUTPATIENT
Start: 2023-05-12 | End: 2023-05-12

## 2023-05-12 RX ORDER — ACETAMINOPHEN 325 MG/1
975 TABLET ORAL ONCE
Status: COMPLETED | OUTPATIENT
Start: 2023-05-12 | End: 2023-05-12

## 2023-05-12 RX ORDER — HYDROMORPHONE HCL IN WATER/PF 6 MG/30 ML
0.4 PATIENT CONTROLLED ANALGESIA SYRINGE INTRAVENOUS EVERY 5 MIN PRN
Status: DISCONTINUED | OUTPATIENT
Start: 2023-05-12 | End: 2023-05-13 | Stop reason: HOSPADM

## 2023-05-12 RX ORDER — ONDANSETRON 2 MG/ML
INJECTION INTRAMUSCULAR; INTRAVENOUS PRN
Status: DISCONTINUED | OUTPATIENT
Start: 2023-05-12 | End: 2023-05-12

## 2023-05-12 RX ORDER — FENTANYL CITRATE 50 UG/ML
INJECTION, SOLUTION INTRAMUSCULAR; INTRAVENOUS PRN
Status: DISCONTINUED | OUTPATIENT
Start: 2023-05-12 | End: 2023-05-12

## 2023-05-12 RX ORDER — GLYCOPYRROLATE 0.2 MG/ML
INJECTION, SOLUTION INTRAMUSCULAR; INTRAVENOUS PRN
Status: DISCONTINUED | OUTPATIENT
Start: 2023-05-12 | End: 2023-05-12

## 2023-05-12 RX ORDER — OXYCODONE HYDROCHLORIDE 10 MG/1
10 TABLET ORAL
Status: DISCONTINUED | OUTPATIENT
Start: 2023-05-12 | End: 2023-05-13 | Stop reason: HOSPADM

## 2023-05-12 RX ORDER — FENTANYL CITRATE 0.05 MG/ML
50 INJECTION, SOLUTION INTRAMUSCULAR; INTRAVENOUS EVERY 5 MIN PRN
Status: DISCONTINUED | OUTPATIENT
Start: 2023-05-12 | End: 2023-05-13 | Stop reason: HOSPADM

## 2023-05-12 RX ORDER — KETOROLAC TROMETHAMINE 30 MG/ML
INJECTION, SOLUTION INTRAMUSCULAR; INTRAVENOUS PRN
Status: DISCONTINUED | OUTPATIENT
Start: 2023-05-12 | End: 2023-05-12

## 2023-05-12 RX ORDER — DEXAMETHASONE SODIUM PHOSPHATE 4 MG/ML
INJECTION, SOLUTION INTRA-ARTICULAR; INTRALESIONAL; INTRAMUSCULAR; INTRAVENOUS; SOFT TISSUE PRN
Status: DISCONTINUED | OUTPATIENT
Start: 2023-05-12 | End: 2023-05-12

## 2023-05-12 RX ORDER — PROPOFOL 10 MG/ML
INJECTION, EMULSION INTRAVENOUS CONTINUOUS PRN
Status: DISCONTINUED | OUTPATIENT
Start: 2023-05-12 | End: 2023-05-12

## 2023-05-12 RX ORDER — FENTANYL CITRATE 0.05 MG/ML
25 INJECTION, SOLUTION INTRAMUSCULAR; INTRAVENOUS EVERY 5 MIN PRN
Status: DISCONTINUED | OUTPATIENT
Start: 2023-05-12 | End: 2023-05-13 | Stop reason: HOSPADM

## 2023-05-12 RX ORDER — ONDANSETRON 2 MG/ML
4 INJECTION INTRAMUSCULAR; INTRAVENOUS EVERY 30 MIN PRN
Status: DISCONTINUED | OUTPATIENT
Start: 2023-05-12 | End: 2023-05-13 | Stop reason: HOSPADM

## 2023-05-12 RX ORDER — HYDROMORPHONE HCL IN WATER/PF 6 MG/30 ML
0.2 PATIENT CONTROLLED ANALGESIA SYRINGE INTRAVENOUS EVERY 5 MIN PRN
Status: DISCONTINUED | OUTPATIENT
Start: 2023-05-12 | End: 2023-05-13 | Stop reason: HOSPADM

## 2023-05-12 RX ORDER — CEFAZOLIN SODIUM 2 G/100ML
2 INJECTION, SOLUTION INTRAVENOUS
Status: COMPLETED | OUTPATIENT
Start: 2023-05-12 | End: 2023-05-12

## 2023-05-12 RX ORDER — OXYCODONE HYDROCHLORIDE 5 MG/1
5 TABLET ORAL
Status: COMPLETED | OUTPATIENT
Start: 2023-05-12 | End: 2023-05-12

## 2023-05-12 RX ORDER — LIDOCAINE 40 MG/G
CREAM TOPICAL
Status: DISCONTINUED | OUTPATIENT
Start: 2023-05-12 | End: 2023-05-13 | Stop reason: HOSPADM

## 2023-05-12 RX ADMIN — CEFAZOLIN SODIUM 2 G: 2 INJECTION, SOLUTION INTRAVENOUS at 09:00

## 2023-05-12 RX ADMIN — PROPOFOL 180 MCG/KG/MIN: 10 INJECTION, EMULSION INTRAVENOUS at 09:05

## 2023-05-12 RX ADMIN — SODIUM CHLORIDE, SODIUM LACTATE, POTASSIUM CHLORIDE, CALCIUM CHLORIDE: 600; 310; 30; 20 INJECTION, SOLUTION INTRAVENOUS at 08:45

## 2023-05-12 RX ADMIN — Medication 30 MG: at 09:05

## 2023-05-12 RX ADMIN — DEXAMETHASONE SODIUM PHOSPHATE 10 MG: 4 INJECTION, SOLUTION INTRA-ARTICULAR; INTRALESIONAL; INTRAMUSCULAR; INTRAVENOUS; SOFT TISSUE at 09:05

## 2023-05-12 RX ADMIN — PROPOFOL 200 MG: 10 INJECTION, EMULSION INTRAVENOUS at 09:05

## 2023-05-12 RX ADMIN — ONDANSETRON 4 MG: 2 INJECTION INTRAMUSCULAR; INTRAVENOUS at 09:44

## 2023-05-12 RX ADMIN — SODIUM CHLORIDE, SODIUM LACTATE, POTASSIUM CHLORIDE, CALCIUM CHLORIDE: 600; 310; 30; 20 INJECTION, SOLUTION INTRAVENOUS at 10:06

## 2023-05-12 RX ADMIN — OXYCODONE HYDROCHLORIDE 5 MG: 5 TABLET ORAL at 10:49

## 2023-05-12 RX ADMIN — ACETAMINOPHEN 975 MG: 325 TABLET ORAL at 08:31

## 2023-05-12 RX ADMIN — Medication 4 MG: at 09:44

## 2023-05-12 RX ADMIN — Medication 50 MCG: at 09:28

## 2023-05-12 RX ADMIN — KETOROLAC TROMETHAMINE 15 MG: 30 INJECTION, SOLUTION INTRAMUSCULAR; INTRAVENOUS at 09:44

## 2023-05-12 RX ADMIN — GLYCOPYRROLATE 0.8 MG: 0.2 INJECTION, SOLUTION INTRAMUSCULAR; INTRAVENOUS at 09:44

## 2023-05-12 RX ADMIN — MAGNESIUM SULFATE HEPTAHYDRATE 4 G: 40 INJECTION, SOLUTION INTRAVENOUS at 08:45

## 2023-05-12 RX ADMIN — FENTANYL CITRATE 25 MCG: 0.05 INJECTION, SOLUTION INTRAMUSCULAR; INTRAVENOUS at 10:22

## 2023-05-12 RX ADMIN — FENTANYL CITRATE 50 MCG: 50 INJECTION, SOLUTION INTRAMUSCULAR; INTRAVENOUS at 10:01

## 2023-05-12 RX ADMIN — LIDOCAINE HYDROCHLORIDE 3 ML: 20 INJECTION, SOLUTION INFILTRATION; PERINEURAL at 09:05

## 2023-05-12 RX ADMIN — FENTANYL CITRATE 100 MCG: 50 INJECTION, SOLUTION INTRAMUSCULAR; INTRAVENOUS at 09:05

## 2023-05-12 NOTE — ANESTHESIA PREPROCEDURE EVALUATION
Anesthesia Pre-Procedure Evaluation    Patient: Giovanny Black   MRN: [id number] : 1990        Procedure : Procedure(s):  CHOLECYSTECTOMY, LAPAROSCOPIC          History reviewed. No pertinent past medical history.   Past Surgical History:   Procedure Laterality Date     ENT SURGERY      wisdom teeth removal      No Known Allergies   Social History     Tobacco Use     Smoking status: Never     Smokeless tobacco: Never   Vaping Use     Vaping status: Not on file   Substance Use Topics     Alcohol use: Yes     Alcohol/week: 1.0 standard drink of alcohol     Types: 1 Standard drinks or equivalent per week     Comment: once a week      Wt Readings from Last 1 Encounters:   23 79.4 kg (175 lb)        Anesthesia Evaluation   Pt has had prior anesthetic.     No history of anesthetic complications       ROS/MED HX  ENT/Pulmonary:  - neg pulmonary ROS     Neurologic:  - neg neurologic ROS     Cardiovascular:  - neg cardiovascular ROS     METS/Exercise Tolerance: >4 METS    Hematologic:  - neg hematologic  ROS     Musculoskeletal:  - neg musculoskeletal ROS     GI/Hepatic:  - neg GI/hepatic ROS   (+) cholecystitis/cholelithiasis,     Renal/Genitourinary:  - neg Renal ROS     Endo:  - neg endo ROS     Psychiatric/Substance Use:  - neg psychiatric ROS     Infectious Disease:  - neg infectious disease ROS     Malignancy:  - neg malignancy ROS     Other:  - neg other ROS          Physical Exam    Airway        Mallampati: II   TM distance: > 3 FB   Neck ROM: full   Mouth opening: > 3 cm    Respiratory Devices and Support         Dental       (+) Minor Abnormalities - some fillings, tiny chips      Cardiovascular   cardiovascular exam normal          Pulmonary   pulmonary exam normal                OUTSIDE LABS:  CBC:   Lab Results   Component Value Date    WBC 4.7 2023    WBC 7.1 01/10/2023    HGB 12.5 2023    HGB 12.8 01/10/2023    HCT 37.8 2023    HCT 38.6 01/10/2023     2023      01/10/2023     BMP:   Lab Results   Component Value Date     04/13/2023     01/10/2023    POTASSIUM 4.2 04/13/2023    POTASSIUM 3.9 01/10/2023    CHLORIDE 106 04/13/2023    CHLORIDE 104 01/10/2023    CO2 24 04/13/2023    CO2 25 01/10/2023    BUN 14 04/13/2023    BUN 11.8 01/10/2023    CR 0.72 04/13/2023    CR 0.77 01/10/2023     04/13/2023    GLC 93 01/10/2023     COAGS: No results found for: PTT, INR, FIBR  POC:   Lab Results   Component Value Date    HCG Negative 05/12/2023    HCGS Negative 04/13/2023     HEPATIC:   Lab Results   Component Value Date    ALBUMIN 3.7 04/13/2023    PROTTOTAL 7.1 04/13/2023     (H) 04/13/2023    AST 1,013 (H) 04/13/2023    ALKPHOS 185 (H) 04/13/2023    BILITOTAL 1.1 (H) 04/13/2023     OTHER:   Lab Results   Component Value Date    NALLELY 8.3 (L) 04/13/2023    MAG 2.0 04/13/2023    LIPASE 39 04/13/2023       Anesthesia Plan    ASA Status:  2   NPO Status:  NPO Appropriate    Anesthesia Type: General.     - Airway: ETT   Induction: Propofol, Intravenous.   Maintenance: TIVA.        Consents    Anesthesia Plan(s) and associated risks, benefits, and realistic alternatives discussed. Questions answered and patient/representative(s) expressed understanding.    - Discussed:     - Discussed with:  Patient, Spouse         Postoperative Care    Pain management: Multi-modal analgesia.   PONV prophylaxis: Ondansetron (or other 5HT-3), Dexamethasone or Solumedrol     Comments:    Other Comments: Reviewed anesthetic options and risks, including risk of dental trauma. Patient agrees to proceed.             Jovani Garcia MD

## 2023-05-12 NOTE — ANESTHESIA PROCEDURE NOTES
Airway       Patient location during procedure: OR       Procedure Start/Stop Times: 5/12/2023 9:11 AM  Staff -        Anesthesiologist:  Jovani Garcia MD       CRNA: Rhianna Vitale APRN CRNA       Performed By: CRNAIndications and Patient Condition       Indications for airway management: toby-procedural       Induction type:intravenous       Mask difficulty assessment: 1 - vent by mask    Final Airway Details       Final airway type: endotracheal airway       Successful airway: ETT - single and Oral  Endotracheal Airway Details        ETT size (mm): 7.0       Cuffed: yes       Successful intubation technique: direct laryngoscopy and video laryngoscopy       DL Blade Type: Cannon 2       VL Blade Size: Glidescope 3       Grade View of Cords: 1       Adjucts: stylet       Position: Right       Measured from: gums/teeth       Secured at (cm): 21       Bite block used: None    Post intubation assessment        Placement verified by: capnometry, equal breath sounds and chest rise        Number of attempts at approach: 2       Secured with: silk tape       Ease of procedure: easy       Dentition: Intact and Unchanged       Dental guard used and removed.    Medication(s) Administered   Medication Administration Time: 5/12/2023 9:11 AM    Additional Comments       DLx1 with Cnanon 2. Unable to obtain adequate view. Pt slightly anterior. Glidescope with ease.

## 2023-05-12 NOTE — ANESTHESIA CARE TRANSFER NOTE
Patient: Giovanny Black    Procedure: Procedure(s):  CHOLECYSTECTOMY, LAPAROSCOPIC       Diagnosis: Biliary colic [K80.50]  Diagnosis Additional Information: No value filed.    Anesthesia Type:   General     Note:    Oropharynx: oropharynx clear of all foreign objects and spontaneously breathing  Level of Consciousness: drowsy  Oxygen Supplementation: face mask  Level of Supplemental Oxygen (L/min / FiO2): 6  Independent Airway: airway patency satisfactory and stable  Dentition: dentition unchanged  Vital Signs Stable: post-procedure vital signs reviewed and stable  Report to RN Given: handoff report given  Patient transferred to: PACU    Handoff Report: Identifed the Patient, Identified the Reponsible Provider, Reviewed the pertinent medical history, Discussed the surgical course, Reviewed Intra-OP anesthesia mangement and issues during anesthesia, Set expectations for post-procedure period and Allowed opportunity for questions and acknowledgement of understanding      Vitals:  Vitals Value Taken Time   /63 05/12/23 1000   Temp 96.7  F (35.9  C) 05/12/23 1000   Pulse 93 05/12/23 1001   Resp 14 05/12/23 1000   SpO2 97 % 05/12/23 1001   Vitals shown include unvalidated device data.    Electronically Signed By: ATIYA Robledo CRNA  May 12, 2023  10:04 AM

## 2023-05-12 NOTE — ANESTHESIA POSTPROCEDURE EVALUATION
Patient: Giovanny Black    Procedure: Procedure(s):  CHOLECYSTECTOMY, LAPAROSCOPIC       Anesthesia Type:  General    Note:  Disposition: Outpatient   Postop Pain Control: Uneventful            Sign Out: Well controlled pain   PONV: No   Neuro/Psych: Uneventful            Sign Out: Acceptable/Baseline neuro status   Airway/Respiratory: Uneventful            Sign Out: Acceptable/Baseline resp. status   CV/Hemodynamics: Uneventful            Sign Out: Acceptable CV status; No obvious hypovolemia; No obvious fluid overload   Other NRE: NONE   DID A NON-ROUTINE EVENT OCCUR? No           Last vitals:  Vitals Value Taken Time   /66 05/12/23 1026   Temp 96.8  F (36  C) 05/12/23 1015   Pulse 56 05/12/23 1026   Resp 16 05/12/23 1026   SpO2 93 % 05/12/23 1026       Electronically Signed By: Jovani Garcia MD  May 12, 2023  10:42 AM

## 2023-05-12 NOTE — DISCHARGE INSTRUCTIONS
If you have any questions or concerns regarding your procedure, please contact Dr. Pinky Le, her office number is [phone].     You have received 975 mg of Acetaminophen (Tylenol) at 8:31am. Please do not take an additional dose of Tylenol until after 2:31pm.    Do not exceed 4,000 mg of acetaminophen during a 24 hour period and keep in mind that acetaminophen can also be found in many over-the-counter cold medications as well as narcotics that may be given for pain.     You received a medication called Toradol (a stronger IV ibuprofen) at 9:44am. Do NOT take any Ibuprofen / Advil / Aleve / Naproxen or products containing Ibuprofen until 3:44pm or later.

## 2023-05-12 NOTE — INTERVAL H&P NOTE
Patient seen in preop with her , Rory.  Denies new medical history since she was last seen.  Has not had further gallstone attacks since she was last seen.  All questions answered regarding procedure.  Consent obtained.  To the OR for laparoscopic cholecystectomy.    Pinky Le DO  General Surgeon  [facility]  Surgery [facility] - [address]  [place] [address]?  Office: [phone]  Employed by - [facility]

## 2023-05-12 NOTE — OP NOTE
Name:  Giovanny Black  PCP:  Kristie Diaz  Procedure Date:  5/12/2023      LAPAROSCOPIC CHOLECYSTECTOMY      Pre-Procedure Diagnosis:  Biliary colic     Post-Procedure Diagnosis:    Biliary colic  Chronic calculus cholecystitis    Surgeon:  Pinky Le DO    Assist:  None    Anesthesia Type:    GET    Estimated Blood Loss:   2 mL    Specimens:    Gallbladder       Drains:   None    Complications:    None apparent    Indication for procedure:  This is a 32-year-old female who has been having intermittent epigastric abdominal pain.  She was found to have cholelithiasis.  It was thought that her symptoms were due to biliary colic and she has elected for operative intervention for treatment.    Operative Report:    After informed consent was obtained, and the risks and benefits of the procedure were discussed, the patient was brought back to the operative suite and placed in the supine position.  General endotracheal anesthesia was administered and tolerated well.  A Time Out was held, and the abdomen was prepped and draped in the usual sterile fashion.  Local anesthetic agent was injected into the skin superior and lateral to the umbilicus and an incision made.  A 5mm optical trocar was placed under direct visualization.  Pneumoperitoneum was established to a pressure of 15 mm Hg.  After local infiltration, three more ports were then placed under direct vision in the epigastrium, right subcostal midclavicular line, and right subcostal mid-axillary line.  The gallbladder was identified with multiple omental adhesions to it.  These findings are consistent with chronic cholecystitis.  The omentum was lysed free from the gallbladder.  The fundus was grasped and retracted cephalad.  The infundibulum was grasped and retracted laterally, exposing the peritoneum overlying the triangle of Calot. This was dissected bluntly and with hook electrocautery until the cystic artery was encountered.  It was isolated, clipped, and  cut.  The cystic duct was clearly identified and freed circumferentially.  The junction of the gallbladder and cystic duct was clearly identified and multiple small stones were within this area.  These were milked back into the gallbladder.  The cystic duct was clipped proximally with 3 large clips and on the gallbladder side with 1 clip.  The cystic duct was divided.  The gallbladder was dissected from the liver bed in retrograde fashion with the electrocautery. The gallbladder was removed with the assistance of an endocatch bag and extracted from the 11 mm trocar site.  Hemostasis was assured.  The fascia of the 11 mm trocar site was then closed with 0 Vicryl.  Pneumoperitoneum was reduced.  The trocars were removed. The skin was then closed with 4-0 Vicryl, followed by a sterile dressing.    Instrument, sponge, and needle counts were correct at closure and at the conclusion of the case.      Disposition:  The patient tolerated the procedure well.  They were transferred to the postanesthesia care unit in stable condition.  If she has persistence or return of gallstone attacks, then she likely has a common duct stone and would need work-up with gastroenterology.      Pinky Le DO  General Surgeon  [facility]  Surgery [facility]  [address] [place] [address] [address]  [place] [address]  Office: [phone]  Employed by - [facility] [employer] [facility]

## 2023-05-14 ENCOUNTER — HEALTH MAINTENANCE LETTER (OUTPATIENT)
Age: 33
End: 2023-05-14

## 2023-06-05 ENCOUNTER — HOSPITAL ENCOUNTER (OUTPATIENT)
Facility: HOSPITAL | Age: 33
Setting detail: OBSERVATION
Discharge: HOME | End: 2023-06-07
Attending: EMERGENCY MEDICINE | Admitting: INTERNAL MEDICINE
Payer: COMMERCIAL

## 2023-06-05 ENCOUNTER — TELEPHONE (OUTPATIENT)
Dept: SURGERY | Facility: CLINIC | Age: 33
End: 2023-06-05

## 2023-06-05 ENCOUNTER — APPOINTMENT (OUTPATIENT)
Dept: MRI IMAGING | Facility: HOSPITAL | Age: 33
End: 2023-06-05
Attending: EMERGENCY MEDICINE
Payer: COMMERCIAL

## 2023-06-05 DIAGNOSIS — K80.50 CHOLEDOCHOLITHIASIS: Primary | ICD-10-CM

## 2023-06-05 DIAGNOSIS — R79.89 ELEVATED LFTS: ICD-10-CM

## 2023-06-05 DIAGNOSIS — K80.50 BILIARY COLIC: Primary | ICD-10-CM

## 2023-06-05 LAB
ALBUMIN SERPL BCG-MCNC: 4.1 G/DL (ref 3.5–5.2)
ALP SERPL-CCNC: 176 U/L (ref 35–104)
ALT SERPL W P-5'-P-CCNC: 379 U/L (ref 10–35)
ANION GAP SERPL CALCULATED.3IONS-SCNC: 12 MMOL/L (ref 7–15)
AST SERPL W P-5'-P-CCNC: 546 U/L (ref 10–35)
BILIRUB DIRECT SERPL-MCNC: 0.47 MG/DL (ref 0–0.3)
BILIRUB SERPL-MCNC: 0.8 MG/DL
BUN SERPL-MCNC: 11.6 MG/DL (ref 6–20)
CALCIUM SERPL-MCNC: 9.3 MG/DL (ref 8.6–10)
CHLORIDE SERPL-SCNC: 105 MMOL/L (ref 98–107)
CREAT SERPL-MCNC: 0.76 MG/DL (ref 0.51–0.95)
DEPRECATED HCO3 PLAS-SCNC: 24 MMOL/L (ref 22–29)
ERYTHROCYTE [DISTWIDTH] IN BLOOD BY AUTOMATED COUNT: 13.3 % (ref 10–15)
GFR SERPL CREATININE-BSD FRML MDRD: >90 ML/MIN/1.73M2
GLUCOSE SERPL-MCNC: 104 MG/DL (ref 70–99)
HCT VFR BLD AUTO: 38.8 % (ref 35–47)
HGB BLD-MCNC: 12.5 G/DL (ref 11.7–15.7)
LIPASE SERPL-CCNC: 40 U/L (ref 13–60)
MCH RBC QN AUTO: 28.5 PG (ref 26.5–33)
MCHC RBC AUTO-ENTMCNC: 32.2 G/DL (ref 31.5–36.5)
MCV RBC AUTO: 88 FL (ref 78–100)
PLATELET # BLD AUTO: 228 10E3/UL (ref 150–450)
POTASSIUM SERPL-SCNC: 3.8 MMOL/L (ref 3.4–5.3)
PROT SERPL-MCNC: 7 G/DL (ref 6.4–8.3)
RBC # BLD AUTO: 4.39 10E6/UL (ref 3.8–5.2)
SODIUM SERPL-SCNC: 141 MMOL/L (ref 136–145)
WBC # BLD AUTO: 7 10E3/UL (ref 4–11)

## 2023-06-05 PROCEDURE — 80053 COMPREHEN METABOLIC PANEL: CPT | Performed by: EMERGENCY MEDICINE

## 2023-06-05 PROCEDURE — 250N000011 HC RX IP 250 OP 636: Performed by: INTERNAL MEDICINE

## 2023-06-05 PROCEDURE — 99222 1ST HOSP IP/OBS MODERATE 55: CPT | Mod: AI | Performed by: INTERNAL MEDICINE

## 2023-06-05 PROCEDURE — A9585 GADOBUTROL INJECTION: HCPCS | Performed by: EMERGENCY MEDICINE

## 2023-06-05 PROCEDURE — G0378 HOSPITAL OBSERVATION PER HR: HCPCS

## 2023-06-05 PROCEDURE — 250N000013 HC RX MED GY IP 250 OP 250 PS 637: Performed by: EMERGENCY MEDICINE

## 2023-06-05 PROCEDURE — 74183 MRI ABD W/O CNTR FLWD CNTR: CPT

## 2023-06-05 PROCEDURE — 85027 COMPLETE CBC AUTOMATED: CPT | Performed by: STUDENT IN AN ORGANIZED HEALTH CARE EDUCATION/TRAINING PROGRAM

## 2023-06-05 PROCEDURE — 99285 EMERGENCY DEPT VISIT HI MDM: CPT | Mod: 25

## 2023-06-05 PROCEDURE — 96361 HYDRATE IV INFUSION ADD-ON: CPT

## 2023-06-05 PROCEDURE — 96360 HYDRATION IV INFUSION INIT: CPT

## 2023-06-05 PROCEDURE — 82248 BILIRUBIN DIRECT: CPT | Performed by: EMERGENCY MEDICINE

## 2023-06-05 PROCEDURE — 255N000002 HC RX 255 OP 636: Performed by: EMERGENCY MEDICINE

## 2023-06-05 PROCEDURE — 83690 ASSAY OF LIPASE: CPT | Performed by: EMERGENCY MEDICINE

## 2023-06-05 PROCEDURE — 85027 COMPLETE CBC AUTOMATED: CPT | Performed by: EMERGENCY MEDICINE

## 2023-06-05 PROCEDURE — 36415 COLL VENOUS BLD VENIPUNCTURE: CPT | Performed by: STUDENT IN AN ORGANIZED HEALTH CARE EDUCATION/TRAINING PROGRAM

## 2023-06-05 RX ORDER — ONDANSETRON 2 MG/ML
4 INJECTION INTRAMUSCULAR; INTRAVENOUS EVERY 6 HOURS PRN
Status: DISCONTINUED | OUTPATIENT
Start: 2023-06-05 | End: 2023-06-07 | Stop reason: HOSPADM

## 2023-06-05 RX ORDER — SODIUM CHLORIDE 9 MG/ML
INJECTION, SOLUTION INTRAVENOUS ONCE
Status: COMPLETED | OUTPATIENT
Start: 2023-06-05 | End: 2023-06-05

## 2023-06-05 RX ORDER — ONDANSETRON 4 MG/1
4 TABLET, ORALLY DISINTEGRATING ORAL EVERY 6 HOURS PRN
Status: DISCONTINUED | OUTPATIENT
Start: 2023-06-05 | End: 2023-06-07 | Stop reason: HOSPADM

## 2023-06-05 RX ORDER — MORPHINE SULFATE 2 MG/ML
1-2 INJECTION, SOLUTION INTRAMUSCULAR; INTRAVENOUS
Status: DISCONTINUED | OUTPATIENT
Start: 2023-06-05 | End: 2023-06-07 | Stop reason: HOSPADM

## 2023-06-05 RX ORDER — OXYCODONE HYDROCHLORIDE 5 MG/1
5 TABLET ORAL ONCE
Status: COMPLETED | OUTPATIENT
Start: 2023-06-05 | End: 2023-06-05

## 2023-06-05 RX ORDER — GADOBUTROL 604.72 MG/ML
8 INJECTION INTRAVENOUS ONCE
Status: COMPLETED | OUTPATIENT
Start: 2023-06-05 | End: 2023-06-05

## 2023-06-05 RX ADMIN — GADOBUTROL 8 ML: 604.72 INJECTION INTRAVENOUS at 20:57

## 2023-06-05 RX ADMIN — DEXTROSE AND SODIUM CHLORIDE: 5; 450 INJECTION, SOLUTION INTRAVENOUS at 21:33

## 2023-06-05 RX ADMIN — OXYCODONE HYDROCHLORIDE 5 MG: 5 TABLET ORAL at 18:06

## 2023-06-05 ASSESSMENT — ACTIVITIES OF DAILY LIVING (ADL)
ADLS_ACUITY_SCORE: 35

## 2023-06-05 NOTE — TELEPHONE ENCOUNTER
Patient had surgery about 3 weeks ago and is now having pain and cramping in her abdomen, not sure if this is normal or she should be seen.    Please call and advise.    Thanks!

## 2023-06-05 NOTE — ED TRIAGE NOTES
Pt s/p cholecystectomy 3 weeks ago.  Pt having epigastric pain for the past 12 hours.  Denies n/v/d.  Is scheduled for ERCP this Friday.     Triage Assessment     Row Name 06/05/23 9086       Triage Assessment (Adult)    Airway WDL WDL       Respiratory WDL    Respiratory WDL WDL       Skin Circulation/Temperature WDL    Skin Circulation/Temperature WDL WDL       Cardiac WDL    Cardiac WDL WDL       Peripheral/Neurovascular WDL    Peripheral Neurovascular WDL WDL       Cognitive/Neuro/Behavioral WDL    Cognitive/Neuro/Behavioral WDL WDL

## 2023-06-05 NOTE — TELEPHONE ENCOUNTER
"[facility] Post-Op Phone Call                     Surgeon: Pinky Le DO    Date of Surgery: Laparoscopic Cholecystectomy 05/12/2023  Discharge Date: 05/12/2023    Date/Time Called:   Date: 6/5/2023 Time: 11:42 AM   Attempt: Sencond  Pain Control:  Intensity: 7/10  Duration/Location/Explain: Upper mid abdominal       Medications:  Narcotic Use - No    Incisions:  Drainage? Clean and Dry  Any fever type symptoms?No  GI:  Nausea? No  Vomiting? No  BM? Yes  Gas? Yes  Voiding Frequency? Yes  Appetite? Good      Spoke to Giovanny. She had been recovering well from surgery but for the past couple of days she has a dull pain in the mid upper abdomen that is a 7/10. It would come and go but now is constant since 3am. No nausea or vomiting, No fevers or chills. She is having normal bowel movements. Will check with Dr. Le.    I put this in the end of my op report - \"If she has persistence or return of gallstone attacks, then she likely has a common duct stone and would need work-up with gastroenterology.\"     I could order an MRCP.  This would take some time....  Or she could just go to [facility] if she is still having pain.  They could do CT and look at LFTs a lot faster.  If she wants the MRCP, you can put the order in under me.          [facility]      Nazanin Bocanegra RN  [facility]  General Surgery  [facility]  [address] [place] [address]  [email or web address]   Office:[phone]  Employed by [facility],               "

## 2023-06-05 NOTE — ED PROVIDER NOTES
EMERGENCY DEPARTMENT ENCOUNTER     NAME: Giovanny Black   AGE: 32 year old female   YOB: 1990   MRN: [id number]   EVALUATION DATE & TIME: 6/5/2023  5:43 PM   PCP: Kristie Diaz     Chief Complaint   Patient presents with     Abdominal Pain     Gallbladder removed 3 weeks ago.   :    FINAL IMPRESSION       1. Elevated LFTs           ED COURSE & MEDICAL DECISION MAKING      Pertinent Labs & Imaging studies reviewed. (See chart for details)   32 year old female  presents to the Emergency Department for evaluation of abdominal pain. Initial Vitals Reviewed. Initial exam notable for generally well-appearing patient with some epigastric tenderness, no jaundice or scleral icterus.  On chart review, she had surgery for cholecystectomy with elevated LFTs but a normal bilirubin on May 12.  She improved, but just overnight has had increased pain in similar region.  I repeated labs to look for elevated LFTs or bilirubin, and she does have elevated LFTs of 500 and nearly 400 but a normal bilirubin.  That I can find, there was no intraoperative cholangiogram at the time of her cholecystectomy.  She is not jaundiced, but I discussed the case with [place] JERROD and the plan will be to order an MRCP tonight.  If positive, they will plan for an ERCP tomorrow.  If negative, they will still plan for likely EUS.  At this time, patient is being kept n.p.o., IV fluids have been started, and I discussed the case with hospitalist for admission.        5:23 PM I introduced myself to the patient, obtained patient history, performed a physical exam, and discussed plan for ED workup including potential diagnostic laboratory/imaging studies and interventions.  7:17 PM Spoke with ZAIN Fernandez.   7:31 PM Spoke with hospitalist, Dr. Izaguirre.       At the conclusion of the encounter I discussed the results of all of the tests and the disposition. The questions were answered. The patient or family acknowledged understanding and was  agreeable with the care plan.         Medical Decision Making    History:    Supplemental history from: Family Member/Significant Other    External Record(s) reviewed: Other: surgical recod 6/12    Work Up:    Chart documentation includes differential considered and any EKGs or imaging independently interpreted by provider, where specified.    In additional to work up documented, I considered the following work up: Documented in chart, if applicable.    External consultation:    Discussion of management with another provider: Documented in chart, if applicable and Gastroenterology and Hospitalist    Complicating factors:    Care impacted by chronic illness: N/A    Care affected by social determinants of health: Access to Medical Care    Disposition considerations: Admit.        MEDICATIONS GIVEN IN THE EMERGENCY:   Medications   oxyCODONE (ROXICODONE) tablet 5 mg (5 mg Oral $Given 6/5/23 1806)      NEW PRESCRIPTIONS STARTED AT TODAY'S ER VISIT   New Prescriptions    No medications on file     ================================================================   HISTORY OF PRESENT ILLNESS       Patient information was obtained from: Patient   Use of Intrepreter: No  Giovanny Black is a 32 year old female with history of cholecystectomy who presents with abdominal pain.     Patient had a cholecystectomy with Dr. Le on may 12 th. She reports that her abdominal pain was getting better since the surgery but it has now worsened. She has been endorsing constant dull epigastric pain for the past 12 hours. She has an appointment scheduled for ERCP this Friday and decided to come in to the ED today because of the intensity of the pain. She rates her pain a 6 or 7/10. She has difficulty taking in deep breaths and can't  her child because of the pain. She denies any nausea, diarrhea, vomiting, or other acute concerns.     ================================================================        PAST HISTORY     PAST  "MEDICAL HISTORY:   History reviewed. No pertinent past medical history.   PAST SURGICAL HISTORY:   Past Surgical History:   Procedure Laterality Date     ENT SURGERY      wisdom teeth removal     LAPAROSCOPIC CHOLECYSTECTOMY N/A 5/12/2023    Procedure: CHOLECYSTECTOMY, LAPAROSCOPIC;  Surgeon: Pinky Le DO;  Location: [facility] OR      CURRENT MEDICATIONS:   HYDROcodone-acetaminophen (NORCO) 5-325 MG tablet  nitroFURantoin macrocrystal-monohydrate (MACROBID) 100 MG capsule  omeprazole (PRILOSEC) 20 MG DR capsule  ondansetron (ZOFRAN ODT) 4 MG ODT tab      ALLERGIES:   No Known Allergies   FAMILY HISTORY:   Family History   Problem Relation Age of Onset     No Known Problems Mother      No Known Problems Father      No Known Problems Brother      Diabetes Maternal Grandfather       SOCIAL HISTORY:   Social History     Socioeconomic History     Marital status:    Tobacco Use     Smoking status: Never     Smokeless tobacco: Never   Substance and Sexual Activity     Alcohol use: Yes     Alcohol/week: 1.0 standard drink of alcohol     Types: 1 Standard drinks or equivalent per week     Comment: once a week     Drug use: No     Sexual activity: Not Currently     Partners: Male        VITALS  Patient Vitals for the past 24 hrs:   BP Temp Temp src Pulse Resp SpO2 Height Weight   06/05/23 1643 (!) 136/93 97  F (36.1  C) Temporal 77 16 99 % 1.676 m (5' 6\") 81.9 kg (180 lb 8 oz)        ================================================================    PHYSICAL EXAM     VITAL SIGNS: BP (!) 136/93   Pulse 77   Temp 97  F (36.1  C) (Temporal)   Resp 16   Ht 1.676 m (5' 6\")   Wt 81.9 kg (180 lb 8 oz)   LMP 05/01/2023   SpO2 99%   BMI 29.13 kg/m     Constitutional:  Awake, no acute distress   HENT:  Atraumatic, oropharynx without exudate or erythema, membranes moist  Lymph:  No adenopathy  Eyes: EOM intact, PERRL, no injection  Neck: Supple  Respiratory:  Clear to auscultation bilaterally, no wheezes or " crackles   Cardiovascular:  Regular rate and rhythm, single S1 and S2   GI:  Soft, nondistended, epigastric tenderness,  no rebound or guarding   Musculoskeletal:  Moves all extremities, no lower extremity edema, no deformities    Skin:  Warm, dry  Neurologic:  Alert and oriented x3, no focal deficits noted       ================================================================  LAB       All pertinent labs reviewed and interpreted.   Labs Ordered and Resulted from Time of ED Arrival to Time of ED Departure   BASIC METABOLIC PANEL - Abnormal       Result Value    Sodium 141      Potassium 3.8      Chloride 105      Carbon Dioxide (CO2) 24      Anion Gap 12      Urea Nitrogen 11.6      Creatinine 0.76      Calcium 9.3      Glucose 104 (*)     GFR Estimate >90     HEPATIC FUNCTION PANEL - Abnormal    Protein Total 7.0      Albumin 4.1      Bilirubin Total 0.8      Alkaline Phosphatase 176 (*)      (*)      (*)     Bilirubin Direct 0.47 (*)    LIPASE - Normal    Lipase 40     CBC WITH PLATELETS - Normal    WBC Count 7.0      RBC Count 4.39      Hemoglobin 12.5      Hematocrit 38.8      MCV 88      MCH 28.5      MCHC 32.2      RDW 13.3      Platelet Count 228          ===============================================================  RADIOLOGY       Reviewed all pertinent imaging. Please see official radiology report.   No orders to display         ================================================================  EKG         I have independently reviewed and interpreted the EKG(s) documented above.     ================================================================  PROCEDURES         I, Nihal Enrico, am serving as a scribe to document services personally performed by Dr. Talamantes based on my observation and the provider's statements to me. I, Alycia Talamantes MD attest that Shannon Weston is acting in a scribe capacity, has observed my performance of the services and has documented them in accordance with my  direction.   Alycia Talamantes M.D.   Emergency Medicine   [facility] EMERGENCY DEPARTMENT  [facility] [facility] [id number] [phone]  Dept: [phone]      Alycia Talamantes MD  06/05/23 2003

## 2023-06-06 ENCOUNTER — ANESTHESIA (OUTPATIENT)
Dept: SURGERY | Facility: HOSPITAL | Age: 33
End: 2023-06-06
Payer: COMMERCIAL

## 2023-06-06 ENCOUNTER — APPOINTMENT (OUTPATIENT)
Dept: RADIOLOGY | Facility: HOSPITAL | Age: 33
End: 2023-06-06
Attending: INTERNAL MEDICINE
Payer: COMMERCIAL

## 2023-06-06 ENCOUNTER — ANESTHESIA EVENT (OUTPATIENT)
Dept: SURGERY | Facility: HOSPITAL | Age: 33
End: 2023-06-06
Payer: COMMERCIAL

## 2023-06-06 LAB
ALBUMIN SERPL BCG-MCNC: 3.7 G/DL (ref 3.5–5.2)
ALP SERPL-CCNC: 222 U/L (ref 35–104)
ALT SERPL W P-5'-P-CCNC: 681 U/L (ref 10–35)
AST SERPL W P-5'-P-CCNC: 603 U/L (ref 10–35)
BILIRUB DIRECT SERPL-MCNC: 0.3 MG/DL (ref 0–0.3)
BILIRUB SERPL-MCNC: 0.9 MG/DL
ERCP: NORMAL
GLUCOSE BLDC GLUCOMTR-MCNC: 101 MG/DL (ref 70–99)
GLUCOSE BLDC GLUCOMTR-MCNC: 153 MG/DL (ref 70–99)
HOLD SPECIMEN: NORMAL
PROT SERPL-MCNC: 6.7 G/DL (ref 6.4–8.3)

## 2023-06-06 PROCEDURE — C1726 CATH, BAL DIL, NON-VASCULAR: HCPCS | Performed by: INTERNAL MEDICINE

## 2023-06-06 PROCEDURE — 99232 SBSQ HOSP IP/OBS MODERATE 35: CPT | Performed by: INTERNAL MEDICINE

## 2023-06-06 PROCEDURE — 250N000013 HC RX MED GY IP 250 OP 250 PS 637: Performed by: INTERNAL MEDICINE

## 2023-06-06 PROCEDURE — C1769 GUIDE WIRE: HCPCS | Performed by: INTERNAL MEDICINE

## 2023-06-06 PROCEDURE — 710N000009 HC RECOVERY PHASE 1, LEVEL 1, PER MIN: Performed by: INTERNAL MEDICINE

## 2023-06-06 PROCEDURE — G0378 HOSPITAL OBSERVATION PER HR: HCPCS

## 2023-06-06 PROCEDURE — 250N000011 HC RX IP 250 OP 636

## 2023-06-06 PROCEDURE — 999N000141 HC STATISTIC PRE-PROCEDURE NURSING ASSESSMENT: Performed by: INTERNAL MEDICINE

## 2023-06-06 PROCEDURE — 360N000082 HC SURGERY LEVEL 2 W/ FLUORO, PER MIN: Performed by: INTERNAL MEDICINE

## 2023-06-06 PROCEDURE — 36415 COLL VENOUS BLD VENIPUNCTURE: CPT | Performed by: INTERNAL MEDICINE

## 2023-06-06 PROCEDURE — 82040 ASSAY OF SERUM ALBUMIN: CPT | Performed by: INTERNAL MEDICINE

## 2023-06-06 PROCEDURE — 272N000001 HC OR GENERAL SUPPLY STERILE: Performed by: INTERNAL MEDICINE

## 2023-06-06 PROCEDURE — 250N000011 HC RX IP 250 OP 636: Performed by: INTERNAL MEDICINE

## 2023-06-06 PROCEDURE — 82962 GLUCOSE BLOOD TEST: CPT

## 2023-06-06 PROCEDURE — 370N000017 HC ANESTHESIA TECHNICAL FEE, PER MIN: Performed by: INTERNAL MEDICINE

## 2023-06-06 PROCEDURE — 999N000180 XR SURGERY CARM FLUORO LESS THAN 5 MIN

## 2023-06-06 PROCEDURE — 96361 HYDRATE IV INFUSION ADD-ON: CPT

## 2023-06-06 PROCEDURE — 250N000009 HC RX 250

## 2023-06-06 RX ORDER — PROPOFOL 10 MG/ML
INJECTION, EMULSION INTRAVENOUS PRN
Status: DISCONTINUED | OUTPATIENT
Start: 2023-06-06 | End: 2023-06-06

## 2023-06-06 RX ORDER — NALOXONE HYDROCHLORIDE 0.4 MG/ML
0.2 INJECTION, SOLUTION INTRAMUSCULAR; INTRAVENOUS; SUBCUTANEOUS
Status: DISCONTINUED | OUTPATIENT
Start: 2023-06-06 | End: 2023-06-07 | Stop reason: HOSPADM

## 2023-06-06 RX ORDER — SODIUM CHLORIDE, SODIUM LACTATE, POTASSIUM CHLORIDE, CALCIUM CHLORIDE 600; 310; 30; 20 MG/100ML; MG/100ML; MG/100ML; MG/100ML
INJECTION, SOLUTION INTRAVENOUS CONTINUOUS
Status: DISCONTINUED | OUTPATIENT
Start: 2023-06-06 | End: 2023-06-07 | Stop reason: HOSPADM

## 2023-06-06 RX ORDER — ONDANSETRON 2 MG/ML
INJECTION INTRAMUSCULAR; INTRAVENOUS PRN
Status: DISCONTINUED | OUTPATIENT
Start: 2023-06-06 | End: 2023-06-06

## 2023-06-06 RX ORDER — NALOXONE HYDROCHLORIDE 0.4 MG/ML
0.4 INJECTION, SOLUTION INTRAMUSCULAR; INTRAVENOUS; SUBCUTANEOUS
Status: DISCONTINUED | OUTPATIENT
Start: 2023-06-06 | End: 2023-06-06

## 2023-06-06 RX ORDER — FLUMAZENIL 0.1 MG/ML
0.2 INJECTION, SOLUTION INTRAVENOUS
Status: ACTIVE | OUTPATIENT
Start: 2023-06-06 | End: 2023-06-07

## 2023-06-06 RX ORDER — NALOXONE HYDROCHLORIDE 0.4 MG/ML
0.2 INJECTION, SOLUTION INTRAMUSCULAR; INTRAVENOUS; SUBCUTANEOUS
Status: DISCONTINUED | OUTPATIENT
Start: 2023-06-06 | End: 2023-06-06

## 2023-06-06 RX ORDER — PROPOFOL 10 MG/ML
INJECTION, EMULSION INTRAVENOUS CONTINUOUS PRN
Status: DISCONTINUED | OUTPATIENT
Start: 2023-06-06 | End: 2023-06-06

## 2023-06-06 RX ORDER — FENTANYL CITRATE 50 UG/ML
INJECTION, SOLUTION INTRAMUSCULAR; INTRAVENOUS PRN
Status: DISCONTINUED | OUTPATIENT
Start: 2023-06-06 | End: 2023-06-06

## 2023-06-06 RX ORDER — LIDOCAINE 40 MG/G
CREAM TOPICAL
Status: DISCONTINUED | OUTPATIENT
Start: 2023-06-06 | End: 2023-06-07 | Stop reason: HOSPADM

## 2023-06-06 RX ORDER — ONDANSETRON 4 MG/1
4 TABLET, ORALLY DISINTEGRATING ORAL EVERY 6 HOURS PRN
Status: DISCONTINUED | OUTPATIENT
Start: 2023-06-06 | End: 2023-06-06

## 2023-06-06 RX ORDER — ONDANSETRON 2 MG/ML
4 INJECTION INTRAMUSCULAR; INTRAVENOUS EVERY 6 HOURS PRN
Status: DISCONTINUED | OUTPATIENT
Start: 2023-06-06 | End: 2023-06-06

## 2023-06-06 RX ORDER — LIDOCAINE HYDROCHLORIDE 10 MG/ML
INJECTION, SOLUTION INFILTRATION; PERINEURAL PRN
Status: DISCONTINUED | OUTPATIENT
Start: 2023-06-06 | End: 2023-06-06

## 2023-06-06 RX ORDER — NALOXONE HYDROCHLORIDE 0.4 MG/ML
0.4 INJECTION, SOLUTION INTRAMUSCULAR; INTRAVENOUS; SUBCUTANEOUS
Status: DISCONTINUED | OUTPATIENT
Start: 2023-06-06 | End: 2023-06-07 | Stop reason: HOSPADM

## 2023-06-06 RX ORDER — LIDOCAINE 40 MG/G
CREAM TOPICAL
Status: DISCONTINUED | OUTPATIENT
Start: 2023-06-06 | End: 2023-06-06

## 2023-06-06 RX ORDER — FENTANYL CITRATE 50 UG/ML
25 INJECTION, SOLUTION INTRAMUSCULAR; INTRAVENOUS EVERY 5 MIN PRN
Status: DISCONTINUED | OUTPATIENT
Start: 2023-06-06 | End: 2023-06-06 | Stop reason: HOSPADM

## 2023-06-06 RX ORDER — FENTANYL CITRATE 50 UG/ML
50 INJECTION, SOLUTION INTRAMUSCULAR; INTRAVENOUS EVERY 5 MIN PRN
Status: DISCONTINUED | OUTPATIENT
Start: 2023-06-06 | End: 2023-06-06 | Stop reason: HOSPADM

## 2023-06-06 RX ORDER — DEXAMETHASONE SODIUM PHOSPHATE 10 MG/ML
INJECTION, SOLUTION INTRAMUSCULAR; INTRAVENOUS PRN
Status: DISCONTINUED | OUTPATIENT
Start: 2023-06-06 | End: 2023-06-06

## 2023-06-06 RX ORDER — HYDROMORPHONE HYDROCHLORIDE 1 MG/ML
0.4 INJECTION, SOLUTION INTRAMUSCULAR; INTRAVENOUS; SUBCUTANEOUS EVERY 5 MIN PRN
Status: DISCONTINUED | OUTPATIENT
Start: 2023-06-06 | End: 2023-06-06 | Stop reason: HOSPADM

## 2023-06-06 RX ORDER — ONDANSETRON 4 MG/1
4 TABLET, ORALLY DISINTEGRATING ORAL EVERY 30 MIN PRN
Status: DISCONTINUED | OUTPATIENT
Start: 2023-06-06 | End: 2023-06-06 | Stop reason: HOSPADM

## 2023-06-06 RX ORDER — HYDROMORPHONE HYDROCHLORIDE 1 MG/ML
0.2 INJECTION, SOLUTION INTRAMUSCULAR; INTRAVENOUS; SUBCUTANEOUS EVERY 5 MIN PRN
Status: DISCONTINUED | OUTPATIENT
Start: 2023-06-06 | End: 2023-06-06 | Stop reason: HOSPADM

## 2023-06-06 RX ORDER — SODIUM CHLORIDE, SODIUM LACTATE, POTASSIUM CHLORIDE, CALCIUM CHLORIDE 600; 310; 30; 20 MG/100ML; MG/100ML; MG/100ML; MG/100ML
INJECTION, SOLUTION INTRAVENOUS CONTINUOUS
Status: DISCONTINUED | OUTPATIENT
Start: 2023-06-06 | End: 2023-06-06 | Stop reason: HOSPADM

## 2023-06-06 RX ORDER — ONDANSETRON 2 MG/ML
4 INJECTION INTRAMUSCULAR; INTRAVENOUS EVERY 30 MIN PRN
Status: DISCONTINUED | OUTPATIENT
Start: 2023-06-06 | End: 2023-06-06 | Stop reason: HOSPADM

## 2023-06-06 RX ADMIN — MIDAZOLAM 2 MG: 1 INJECTION INTRAMUSCULAR; INTRAVENOUS at 14:00

## 2023-06-06 RX ADMIN — FENTANYL CITRATE 50 MCG: 50 INJECTION, SOLUTION INTRAMUSCULAR; INTRAVENOUS at 14:16

## 2023-06-06 RX ADMIN — OXYCODONE HYDROCHLORIDE 5 MG: 5 TABLET ORAL at 09:03

## 2023-06-06 RX ADMIN — PROPOFOL 200 MCG/KG/MIN: 10 INJECTION, EMULSION INTRAVENOUS at 14:06

## 2023-06-06 RX ADMIN — LIDOCAINE HYDROCHLORIDE 3 ML: 10 INJECTION, SOLUTION INFILTRATION; PERINEURAL at 14:06

## 2023-06-06 RX ADMIN — DEXAMETHASONE SODIUM PHOSPHATE 4 MG: 10 INJECTION, SOLUTION INTRAMUSCULAR; INTRAVENOUS at 14:06

## 2023-06-06 RX ADMIN — ONDANSETRON 4 MG: 2 INJECTION INTRAMUSCULAR; INTRAVENOUS at 14:16

## 2023-06-06 RX ADMIN — SUGAMMADEX 200 MG: 100 INJECTION, SOLUTION INTRAVENOUS at 14:25

## 2023-06-06 RX ADMIN — FENTANYL CITRATE 50 MCG: 50 INJECTION, SOLUTION INTRAMUSCULAR; INTRAVENOUS at 14:32

## 2023-06-06 RX ADMIN — OXYCODONE HYDROCHLORIDE 5 MG: 5 TABLET ORAL at 17:04

## 2023-06-06 RX ADMIN — DEXTROSE AND SODIUM CHLORIDE: 5; 450 INJECTION, SOLUTION INTRAVENOUS at 09:06

## 2023-06-06 RX ADMIN — PROPOFOL 200 MG: 10 INJECTION, EMULSION INTRAVENOUS at 14:06

## 2023-06-06 RX ADMIN — ROCURONIUM BROMIDE 40 MG: 50 INJECTION, SOLUTION INTRAVENOUS at 14:06

## 2023-06-06 ASSESSMENT — ACTIVITIES OF DAILY LIVING (ADL)
ADLS_ACUITY_SCORE: 35
ADLS_ACUITY_SCORE: 18
ADLS_ACUITY_SCORE: 18
ADLS_ACUITY_SCORE: 35
ADLS_ACUITY_SCORE: 18
ADLS_ACUITY_SCORE: 35
ADLS_ACUITY_SCORE: 18
ADLS_ACUITY_SCORE: 35

## 2023-06-06 ASSESSMENT — ENCOUNTER SYMPTOMS
SEIZURES: 0
DYSRHYTHMIAS: 0

## 2023-06-06 ASSESSMENT — LIFESTYLE VARIABLES: TOBACCO_USE: 0

## 2023-06-06 ASSESSMENT — COPD QUESTIONNAIRES: COPD: 0

## 2023-06-06 NOTE — H&P
GENERAL PRE-PROCEDURE:   Procedure:  Choledocholithiasis - ERCP   Date/Time:  6/6/2023 1:50 PM    Verbal consent obtained?: Yes    Written consent obtained?: Yes    Risks and benefits: Risks, benefits and alternatives were discussed    Consent given by:  Patient  Patient states understanding of procedure being performed: Yes    Patient's understanding of procedure matches consent: Yes    Procedure consent matches procedure scheduled: Yes    Expected level of sedation:  Deep  Appropriately NPO:  Yes  ASA Class:  2  Mallampati  :  Grade 2- soft palate, base of uvula, tonsillar pillars, and portion of posterior pharyngeal wall visible  Lungs:  Lungs clear with good breath sounds bilaterally  Heart:  Normal heart sounds and rate  History & Physical reviewed:  History and physical reviewed and no updates needed  Statement of review:  I have reviewed the lab findings, diagnostic data, medications, and the plan for sedation

## 2023-06-06 NOTE — PROGRESS NOTES
PRIMARY DIAGNOSIS: ACUTE PAIN  OUTPATIENT/OBSERVATION GOALS TO BE MET BEFORE DISCHARGE:  1. Pain Status: Improved-controlled with oral pain medications.    2. Return to near baseline physical activity: Yes    3. Cleared for discharge by consultants (if involved): No    Discharge Planner Nurse   Safe discharge environment identified: Yes  Barriers to discharge: Yes       Entered by: ANIL LÓPEZ RN 06/06/2023 2:56 AM     Please review provider order for any additional goals.   Nurse to notify provider when observation goals have been met and patient is ready for discharge.

## 2023-06-06 NOTE — ANESTHESIA CARE TRANSFER NOTE
Patient: Giovanny Black    Procedure: Procedure(s):  ENDOSCOPIC RETROGRADE CHOLANGIOPANCREATOGRAPHY, STONE EXTRACTION       Diagnosis: Choledocholithiasis [K80.50]  Diagnosis Additional Information: No value filed.    Anesthesia Type:   General     Note:      Level of Consciousness: awake  Oxygen Supplementation: face mask  Level of Supplemental Oxygen (L/min / FiO2): 10  Independent Airway: airway patency satisfactory and stable  Dentition: dentition unchanged  Vital Signs Stable: post-procedure vital signs reviewed and stable  Report to RN Given: handoff report given  Patient transferred to: PACU    Handoff Report: Identifed the Patient, Identified the Reponsible Provider, Reviewed the pertinent medical history, Discussed the surgical course, Reviewed Intra-OP anesthesia mangement and issues during anesthesia, Set expectations for post-procedure period and Allowed opportunity for questions and acknowledgement of understanding      Vitals:  Vitals Value Taken Time   /66 06/06/23 1437   Temp 36.5  C (97.7  F) 06/06/23 1437   Pulse 73 06/06/23 1442   Resp 20 06/06/23 1442   SpO2 97 % 06/06/23 1442   Vitals shown include unvalidated device data.    Electronically Signed By: ATIYA Poe CRNA  June 6, 2023  2:44 PM

## 2023-06-06 NOTE — ANESTHESIA POSTPROCEDURE EVALUATION
Patient: Giovanny Black    Procedure: Procedure(s):  ENDOSCOPIC RETROGRADE CHOLANGIOPANCREATOGRAPHY, STONE EXTRACTION       Anesthesia Type:  General    Note:  Disposition: Outpatient   Postop Pain Control: Uneventful            Sign Out: Well controlled pain   PONV: No   Neuro/Psych: Uneventful            Sign Out: Acceptable/Baseline neuro status   Airway/Respiratory: Uneventful            Sign Out: Acceptable/Baseline resp. status   CV/Hemodynamics: Uneventful            Sign Out: Acceptable CV status; No obvious hypovolemia; No obvious fluid overload   Other NRE: NONE   DID A NON-ROUTINE EVENT OCCUR?            Last vitals:  Vitals Value Taken Time   /67 06/06/23 1500   Temp 36.5  C (97.7  F) 06/06/23 1437   Pulse 71 06/06/23 1503   Resp 23 06/06/23 1503   SpO2 97 % 06/06/23 1503   Vitals shown include unvalidated device data.    Electronically Signed By: Kareem Carr MD  June 6, 2023  3:04 PM

## 2023-06-06 NOTE — PHARMACY-ADMISSION MEDICATION HISTORY
Pharmacist Admission Medication History    Admission medication history is complete. The information provided in this note is only as accurate as the sources available at the time of the update.    Medication reconciliation/reorder completed by provider prior to medication history? No    Information Source(s): Patient and CareEverywhere/SureScripts via in-person    Pertinent Information: None    Changes made to PTA medication list:    Added: None    Deleted: Macrobid, zofran, omeprazole    Changed: None    Medication Affordability:  Not including over the counter (OTC) medications, was there a time in the past 3 months when you did not take your medications as prescribed because of cost?: No    Allergies reviewed with patient and updates made in EHR: yes    Medication History Completed By: Bianca Sheikh [facility] 6/5/2023 8:01 PM    Prior to Admission medications    Medication Sig Last Dose Taking? Auth Provider Long Term End Date   HYDROcodone-acetaminophen (NORCO) 5-325 MG tablet Take 1 tablet by mouth every 4 hours as needed for moderate to severe pain Unknown Yes Pinky Le, DO

## 2023-06-06 NOTE — ED NOTES
"[facility] ED Handoff Report    ED Chief Complaint: abd pain upper mid epigastric area    ED Diagnosis:  (R79.89) Elevated LFTs  Comment:   Plan: obs       PMH:  History reviewed. No pertinent past medical history.     Code Status:  Full Code     Falls Risk: No Band: Not applicable    Current Living Situation/Residence: lives with a significant other     Elimination Status: Continent: Yes     Activity Level: Independent    Patients Preferred Language:  English     Needed: No    Vital Signs:  /69   Pulse 66   Temp 97  F (36.1  C) (Temporal)   Resp 16   Ht 1.676 m (5' 6\")   Wt 81.9 kg (180 lb 8 oz)   LMP 05/01/2023   SpO2 100%   BMI 29.13 kg/m       Cardiac Rhythm: nsr    Pain Score: 3/10    Is the Patient Confused:  No    Last Food or Drink: 06/05/23 at     Focused Assessment:      Tests Performed: Done: Labs and Imaging    Treatments Provided:  meds and fluids    Family Dynamics/Concerns: No    Family Updated On Visitor Policy: Yes    Plan of Care Communicated to Family: Yes    Who Was Updated about Plan of Care: self    Belongings Checklist Done and Signed by Patient: Yes    Medications sent with patient:     Covid: asymptomatic , N/A    Additional Information:     RN: Lola Carrillo RN   6/5/2023 9:41 PM      "

## 2023-06-06 NOTE — CONSULTS
GI CONSULT NOTE      Name: Giovanny Black    Medical Record #: [id number]    YOB: 1990    Date: 6/6/2023    CC: We were consulted by Marika Izaguirre MD to see Giovanny Black in regards to elevated LFT's in setting pf recent cholecystectomy.     HPI: This is a 32 year old female with a history of cholelithiasis, s/p cholecystectomy May 12, 2023. She is currently 8 months post partum.  After delivery of her daughter, she had intermittent epigastric pain lasting up to 2 hours.  She had brief improvement in her abdominal pain  post-cholecystectomy though pain eventually returned. Pt had elevated LFT's in April, though minimal T bili elevation (1.1) and US was unremarkable.  This past Friday she had recurrence of the pain that was reminiscent of her biliary colic before her CCY.   That pain eventually decreased but had a dull pain in her upper abdomen over the weekend.  Her pain worsened yesterday and she contacted her surgeon, Dr Le.  Ultimately, plans were made for ERCP this Friday June 9th.  Patient's pain increased in intensity so she came in to the ED and was found to have elevated LFTs.  She denies fever, nausea, vomiting, chest pain, sob.  Took 200 mg ibuprofen yesterday.     Today's labs shows , , alk phos 222, T bili 0.9.  Lipase 29.    MRCP showed what appears to be a 1/6 x 1.6 cm stone in distal bile duct.  CBD is 4.7mm.  Pancreas normal.     Past medical history  Cholecystectomy April 2023.     Family history - neg for gallbladder disorders.  Family History   Problem Relation Age of Onset     No Known Problems Mother      No Known Problems Father      No Known Problems Brother      Diabetes Maternal Grandfather         Social history  Social History     Tobacco Use     Smoking status: Never     Smokeless tobacco: Never   Substance Use Topics     Alcohol use: Yes     Alcohol/week: 1.0 standard drink of alcohol     Types: 1 Standard drinks or equivalent per week     Comment:  "once a week     Drug use: No       Medications:   Current Outpatient Medications   Medication Sig Dispense Refill     HYDROcodone-acetaminophen (NORCO) 5-325 MG tablet Take 1 tablet by mouth every 4 hours as needed for moderate to severe pain 12 tablet 0          Allergies:  No Known Allergies       REVIEW OF SYSTEMS (ROS): Review of systems is as per HPI.  Remainder of complete review of systems is negative.      PHYSICAL EXAMINATION:      /73 (BP Location: Left arm, Patient Position: Semi-Davila's)   Pulse 87   Temp 97.7  F (36.5  C) (Oral)   Resp 16   Ht 1.676 m (5' 6\")   Wt 81.9 kg (180 lb 8 oz)   LMP 05/01/2023   SpO2 100%   BMI 29.13 kg/m    GEN: Well developed, well nourished 32 year old female in no acute distress.  HEENT: sclera anicteric, moist mucous membranes, neck soft and supple.  LYMPH: No cervical lymphadenopathy  PULM: lungs clear to auscultation bilaterally.  CARDIO: Regular rate and rhythm  GI: Non-distended.  Soft.  Mildy-tender to palpation in RUQ.  No guarding.  EXT: warm, no lower extremity edema  NEURO: Alert and oriented.  Speech fluid.    PSYCH: Mental status appropriate, mood and affect normal.      LABS and IMAGING  Recent Labs   Lab 06/05/23  1656   WBC 7.0   RBC 4.39   HGB 12.5   HCT 38.8   MCV 88   MCH 28.5   MCHC 32.2   RDW 13.3         Recent Labs   Lab 06/05/23  1656      CO2 24   BUN 11.6     Recent Labs   Lab 06/06/23  0631 06/05/23  1656   ALKPHOS 222* 176*   * 546*   * 379*     No results found for: INR, TROPONIN, TROPI, TROPONIN, TROPR, TROPN    MR Abdomen MRCP w/o & w Contrast    Result Date: 6/5/2023  EXAM: MR ABDOMEN MRCP W/O and W CONTRAST LOCATION: [facility] DATE/TIME: 6/5/2023 9:07 PM CDT INDICATION: Abnormal LFTs COMPARISON: None. TECHNIQUE: Routine MR liver/pancreas protocol including axial and coronal MRCP sequences. 2D and 3D reconstruction performed by MR technologist including MIP reconstruction and " slab cholangiograms. If performed with contrast, additional dynamic T1 post IV contrast images. CONTRAST: 8 ml Gadavist FINDINGS: MRCP: There is approximately 1.6 x 1.6 mm filling defect seen in the distal aspect of the common bile duct. There is no significant dilatation of the common bile duct which measures approximately 4.7 mm in greatest radial dimension. No evidence for intrahepatic bile duct dilatation. The gallbladder is surgically absent. LIVER: Normal PANCREAS: Normal ADDITIONAL FINDINGS: Minute simple cyst left kidney and no follow-up is recommended.     IMPRESSION: 1.  The gallbladder is surgically absent. 2.  1.6 x 1.6 mm nonobstructive stone is seen in the distal aspect of the common bile duct.       ASSESSMENT  1. RUQ abd pain  2. Elevated LFT's  3. CBD filling defect on MRCP consistent with choledocholithiasis.     This is a 33 yo female, 8 months post partum, who underwent lap CCY in 2023 for cholelithiasis.  Her LFT's were elevated at that time though US unremarkable. Pt now admitted with recurrence of RUQ abd pain, elevated LFT's, and MRCP showing what appears to be a 1.6 x 1.6mm stone in distal CBD. Plan is for ERCP with sphincterotomy and stone removal today.     Patient Active Problem List   Diagnosis     Recurrent UTI     Dysfunctional labor     Nuchal cord with compression, delivered, current hospitalization      premature rupture of membranes (PPROM) with unknown onset of labor     Prolonged rupture of membranes, greater than 24 hours, delivered, current hospitalization     Prolonged second stage of labor, delivered     Subchorionic hematoma in first trimester     Vaginal delivery     Biliary colic     Elevated LFTs     PLAN  1. NPO  2. ERCP today.   3. IV fluids  4. Analgesia PRN    A total of 40 minutes was spent on today's care.  This included chart review, discussion with RN, evaluation and examination of the patient, discussion with Dr Mcmanus and Dr birmingham, formulation  of assessmnet and plan, .      Thank you for asking to consult on this patient.    Jh Hatfield PA-C   6/6/2023 8:00 AM  [facility]  [phone]

## 2023-06-06 NOTE — ANESTHESIA PREPROCEDURE EVALUATION
Anesthesia Pre-Procedure Evaluation    Patient: Giovanny Black   MRN: [id number] : 1990        Procedure : Procedure(s):  ENDOSCOPIC RETROGRADE CHOLANGIOPANCREATOGRAPHY          History reviewed. No pertinent past medical history.   Past Surgical History:   Procedure Laterality Date     ENT SURGERY      wisdom teeth removal     LAPAROSCOPIC CHOLECYSTECTOMY N/A 2023    Procedure: CHOLECYSTECTOMY, LAPAROSCOPIC;  Surgeon: Pinky Le DO;  Location: [facility]      No Known Allergies   Social History     Tobacco Use     Smoking status: Never     Smokeless tobacco: Never   Vaping Use     Vaping status: Not on file   Substance Use Topics     Alcohol use: Yes     Alcohol/week: 1.0 standard drink of alcohol     Types: 1 Standard drinks or equivalent per week     Comment: once a week      Wt Readings from Last 1 Encounters:   23 81.9 kg (180 lb 8 oz)        Anesthesia Evaluation   Pt has had prior anesthetic.     No history of anesthetic complications       ROS/MED HX  ENT/Pulmonary:    (-) tobacco use, asthma, COPD, sleep apnea, OLGA risk factors and recent URI   Neurologic:    (-) no seizures and no CVA   Cardiovascular:    (-) hypertension, taking anticoagulants/antiplatelets, syncope and arrhythmias   METS/Exercise Tolerance: >4 METS    Hematologic:    (-) anemia   Musculoskeletal:       GI/Hepatic: Comment: history of cholelithiasis, s/p cholecystectomy 2023   (-) GERD   Renal/Genitourinary:    (-) renal disease   Endo: Comment: 8 mo postpartum, patient is not breastfeeding    (+) Obesity,  (-) Type I DM, Type II DM and thyroid disease   Psychiatric/Substance Use:    (-) chronic opioid use history   Infectious Disease:    (-) Recent Fever   Malignancy:       Other:            Physical Exam    Airway        Mallampati: II   TM distance: > 3 FB   Neck ROM: full   Mouth opening: > 3 cm    Respiratory Devices and Support         Dental     Comment: Good dentition, no loose or removable  teeth        Cardiovascular          Rhythm and rate: regular and normal     Pulmonary   pulmonary exam normal                OUTSIDE LABS:  CBC:   Lab Results   Component Value Date    WBC 7.0 06/05/2023    WBC 4.7 04/13/2023    HGB 12.5 06/05/2023    HGB 12.5 04/13/2023    HCT 38.8 06/05/2023    HCT 37.8 04/13/2023     06/05/2023     04/13/2023     BMP:   Lab Results   Component Value Date     06/05/2023     04/13/2023    POTASSIUM 3.8 06/05/2023    POTASSIUM 4.2 04/13/2023    CHLORIDE 105 06/05/2023    CHLORIDE 106 04/13/2023    CO2 24 06/05/2023    CO2 24 04/13/2023    BUN 11.6 06/05/2023    BUN 14 04/13/2023    CR 0.76 06/05/2023    CR 0.72 04/13/2023     (H) 06/05/2023     04/13/2023     COAGS: No results found for: PTT, INR, FIBR  POC:   Lab Results   Component Value Date    HCG Negative 05/12/2023    HCGS Negative 04/13/2023     HEPATIC:   Lab Results   Component Value Date    ALBUMIN 3.7 06/06/2023    PROTTOTAL 6.7 06/06/2023     (HH) 06/06/2023     (HH) 06/06/2023    ALKPHOS 222 (H) 06/06/2023    BILITOTAL 0.9 06/06/2023     OTHER:   Lab Results   Component Value Date    NALLELY 9.3 06/05/2023    MAG 2.0 04/13/2023    LIPASE 40 06/05/2023       Anesthesia Plan    ASA Status:  1   NPO Status:  NPO Appropriate    Anesthesia Type: General.     - Airway: ETT      Maintenance: TIVA.        Consents    Anesthesia Plan(s) and associated risks, benefits, and realistic alternatives discussed. Questions answered and patient/representative(s) expressed understanding.    - Discussed:     - Discussed with:  Patient         Postoperative Care    Pain management: IV analgesics, Multi-modal analgesia.   PONV prophylaxis: Ondansetron (or other 5HT-3), Dexamethasone or Solumedrol     Comments:    Other Comments:   GETA  TIVA  Ketorolac 15 mg if ok w/ surgeon  Dexamethasone, ondansetron PONV ppx            Serina Avina MD

## 2023-06-06 NOTE — ANESTHESIA PROCEDURE NOTES
Airway       Patient location during procedure: OR       Procedure Start/Stop Times: 6/6/2023 2:09 PM  Staff -        Anesthesiologist:  Serina Avina MD       CRNA: Trung Ceballos APRN CRNA       Performed By: CRNA  Consent for Airway        Urgency: elective  Indications and Patient Condition       Indications for airway management: toby-procedural       Induction type:intravenous       Mask difficulty assessment: 1 - vent by mask    Final Airway Details       Final airway type: endotracheal airway       Successful airway: ETT - single  Endotracheal Airway Details        ETT size (mm): 7.0       Cuffed: yes       Cuff volume (mL): 8       Successful intubation technique: direct laryngoscopy       DL Blade Type: Cannon 3       Grade View of Cords: 1       Adjucts: stylet       Position: Right       Measured from: lips       Secured at (cm): 21       Bite block used: None    Post intubation assessment        Placement verified by: capnometry, equal breath sounds and chest rise        Number of attempts at approach: 1       Number of other approaches attempted: 0       Secured with: silk tape       Ease of procedure: easy       Dentition: Intact and Unchanged       Dental guard used and removed. Dental Guard Type: Proguard Red.    Medication(s) Administered   Medication Administration Time: 6/6/2023 2:09 PM

## 2023-06-06 NOTE — H&P
"[facility]    History and Physical - Hospitalist Service       Date of Admission:  6/5/2023    Assessment & Plan      Giovanny Black is a 32 year old female admitted on 6/5/2023 for elevated LFT status post recent cholecystectomy.    Elevated LFT: Persistent since cholecystectomy 3 weeks ago, concerning for bile duct stone. No intraoperative cholangiogram at that time.  -Per GI, perform MRCP. Depending on the result, will do ERCP or EUS.  -Monitor LFT  -N.p.o. after midnight  -Pain control     Diet:  Clear liquid diet  DVT Prophylaxis: Low Risk/Ambulatory with no VTE prophylaxis indicated  Muñoz Catheter: Not present  Lines: None     Cardiac Monitoring: None  Code Status:  Full code    Clinically Significant Risk Factors Present on Admission                       # Overweight: Estimated body mass index is 29.13 kg/m  as calculated from the following:    Height as of this encounter: 1.676 m (5' 6\").    Weight as of this encounter: 81.9 kg (180 lb 8 oz).            Disposition Plan      Expected Discharge Date: 06/06/2023                  Marika Izaguirre MD  Hospitalist Service  [facility]  Securely message with [employer] (more info)  Text page via [facility] Paging/Directory     ______________________________________________________________________    Chief Complaint   Right upper quadrant abdominal pain    History is obtained from the patient    History of Present Illness   Giovanny Black is a 32 year old female with recent laparoscopic cholecystectomy presents to ER for right upper quadrant abdominal pain.  Patient reports that 3 weeks ago, she had laparoscopic cholecystectomy for acute cholecystitis.  At that time, she had right upper quadrant abdominal pain.  After surgery, she felt well until 2 days ago.  She had recurrent right upper quadrant abdominal pain at the same location.  The pain then subsided.  This morning, the pain returned and has become persistent.  She " rates the pain as 6 out of 10.  She reports no fever, nausea, and vomiting.  No chest pain and shortness of breath.  In ER, patient was found to have elevated LFT.  It was elevated 3 weeks ago and did not resolve since then. There was no intraoperative cholangiogram at the time of her cholecystectomy. Per on-call gastroenterologist, MRCP was performed.      Past Medical History    History reviewed. No pertinent past medical history.    Past Surgical History   Past Surgical History:   Procedure Laterality Date     ENT SURGERY      wisdom teeth removal     LAPAROSCOPIC CHOLECYSTECTOMY N/A 5/12/2023    Procedure: CHOLECYSTECTOMY, LAPAROSCOPIC;  Surgeon: Pinky Le DO;  Location: [facility] OR       Prior to Admission Medications   Prior to Admission Medications   Prescriptions Last Dose Informant Patient Reported? Taking?   HYDROcodone-acetaminophen (NORCO) 5-325 MG tablet Unknown  No Yes   Sig: Take 1 tablet by mouth every 4 hours as needed for moderate to severe pain      Facility-Administered Medications: None        Review of Systems    The 10 point Review of Systems is negative other than noted in the HPI or here.      Physical Exam   Vital Signs: Temp: 97  F (36.1  C) Temp src: Temporal BP: 119/69 Pulse: 66   Resp: 16 SpO2: 100 % O2 Device: None (Room air)    Weight: 180 lbs 8 oz    General appearance: not in acute distress  HEENT: PERRL, EOMI  Lungs: Clear breath sounds in bilateral lung fields  Cardiovascular: Regular rate and rhythm, normal S1-S2  Abdomen: Soft, RUQ tender to palpation, no distension, normal bowel sound  Musculoskeletal: No joint swelling  Skin: No rash and no edema  Neurology: AAO ×3.  Cranial nerves II - XII normal.  Normal muscle strength in all four extremities.    Medical Decision Making       60 MINUTES SPENT BY ME on the date of service doing chart review, history, exam, documentation & further activities per the note.      Data     I have personally reviewed the following data  over the past 24 hrs:    7.0  \   12.5   / 228     141 105 11.6 /  104 (H)   3.8 24 0.76 \       ALT: 379 (H) AST: 546 (HH) AP: 176 (H) TBILI: 0.8   ALB: 4.1 TOT PROTEIN: 7.0 LIPASE: 40       Imaging results reviewed over the past 24 hrs:   Recent Results (from the past 24 hour(s))   MR Abdomen MRCP w/o & w Contrast    Narrative    EXAM: MR ABDOMEN MRCP W/O and W CONTRAST  LOCATION: [facility]  DATE/TIME: 6/5/2023 9:07 PM CDT    INDICATION: Abnormal LFTs  COMPARISON: None.  TECHNIQUE: Routine MR liver/pancreas protocol including axial and coronal MRCP sequences. 2D and 3D reconstruction performed by MR technologist including MIP reconstruction and slab cholangiograms. If performed with contrast, additional dynamic T1 post   IV contrast images.  CONTRAST: 8 ml Gadavist     FINDINGS:     MRCP: There is approximately 1.6 x 1.6 mm filling defect seen in the distal aspect of the common bile duct. There is no significant dilatation of the common bile duct which measures approximately 4.7 mm in greatest radial dimension. No evidence for   intrahepatic bile duct dilatation. The gallbladder is surgically absent.    LIVER: Normal    PANCREAS: Normal    ADDITIONAL FINDINGS: Minute simple cyst left kidney and no follow-up is recommended.      Impression    IMPRESSION:  1.  The gallbladder is surgically absent.    2.  1.6 x 1.6 mm nonobstructive stone is seen in the distal aspect of the common bile duct.

## 2023-06-06 NOTE — PROGRESS NOTES
PRIMARY DIAGNOSIS: ACUTE PAIN  OUTPATIENT/OBSERVATION GOALS TO BE MET BEFORE DISCHARGE:  1. Pain Status: Improved-controlled with oral pain medications.    2. Return to near baseline physical activity: Yes    3. Cleared for discharge by consultants (if involved): No    Discharge Planner Nurse   Safe discharge environment identified: Yes  Barriers to discharge: Yes       Entered by: ANIL LÓPEZ RN 06/06/2023 6:06 AM     Please review provider order for any additional goals.   Nurse to notify provider when observation goals have been met and patient is ready for discharge.

## 2023-06-06 NOTE — PROGRESS NOTES
Daily Progress Note        CODE STATUS:  Full Code    23  Assessment/Plan:  Giovanny Black is a 32 year old female admitted on 2023 for abdominal pain and elevated LFTs. She has had lap cholecystectomy done on 23 with Dr Le.      Elevated LFT  Abdominal pain  -- Persistent since cholecystectomy 3 weeks ago, concerning for bile duct stone. No intraoperative cholangiogram at that time.  -- MRCP showed a 1.6 x 1.6mm filling defect in the distal CBD. CBD doesn't appear dilated  -- LFTs are a little worse this morning  -- GI consulted. For ERCP today. No need for antibiotics unless GI recommends differently.      Disposition; Home this evening vs tomorrow if ok with GI  Barrier to discharge; ERCP today     LOS: 0 days     Subjective:  Interval History: Patient seen and examined in ED room 33. Notes, labs, imaging reports personally reviewed. Patient is new to me today. Pain was well controlled as she has been received some pain meds. No fevers or chills. No nausea or vomiting.     Review of Systems:   As mentioned in subjective.    Patient Active Problem List   Diagnosis     Recurrent UTI     Dysfunctional labor     Nuchal cord with compression, delivered, current hospitalization      premature rupture of membranes (PPROM) with unknown onset of labor     Prolonged rupture of membranes, greater than 24 hours, delivered, current hospitalization     Prolonged second stage of labor, delivered     Subchorionic hematoma in first trimester     Vaginal delivery     Biliary colic     Elevated LFTs       Scheduled Meds:    sodium chloride (PF)  3 mL Intracatheter Q8H     sodium chloride (PF)  3 mL Intracatheter Q8H     Continuous Infusions:    dextrose 5% and 0.45% NaCl 75 mL/hr at 23 0906     lactated ringers       PRN Meds:.lidocaine 4%, lidocaine (buffered or not buffered), [Auto Hold] melatonin, [Auto Hold] morphine, [Auto Hold] ondansetron **OR** [Auto Hold] ondansetron, [Auto Hold]  oxyCODONE, sodium chloride (PF), sodium chloride (PF)    Objective:  Vital signs in last 24 hours:  Temp:  [96.8  F (36  C)-97.7  F (36.5  C)] 97.5  F (36.4  C)  Pulse:  [66-87] 87  Resp:  [16-18] 16  BP: (115-136)/(69-93) 123/82  SpO2:  [99 %-100 %] 99 %      No intake or output data in the 24 hours ending 06/06/23 1358    Physical Exam:    General: Not in obvious distress.  HEENT: NC, AT   Chest: Clear to auscultation bilaterally  Heart: S1S2 normal, regular. No M/R/G  Abdomen: Soft. Mild tenderness in the epigastric area. Bowel sounds- active.  Extremities: No legs swelling  Neuro: Alert and awake, grossly non-focal      Lab Results:(I have personally reviewed the results)    Recent Results (from the past 24 hour(s))   Basic metabolic panel    Collection Time: 06/05/23  4:56 PM   Result Value Ref Range    Sodium 141 136 - 145 mmol/L    Potassium 3.8 3.4 - 5.3 mmol/L    Chloride 105 98 - 107 mmol/L    Carbon Dioxide (CO2) 24 22 - 29 mmol/L    Anion Gap 12 7 - 15 mmol/L    Urea Nitrogen 11.6 6.0 - 20.0 mg/dL    Creatinine 0.76 0.51 - 0.95 mg/dL    Calcium 9.3 8.6 - 10.0 mg/dL    Glucose 104 (H) 70 - 99 mg/dL    GFR Estimate >90 >60 mL/min/1.73m2   Lipase    Collection Time: 06/05/23  4:56 PM   Result Value Ref Range    Lipase 40 13 - 60 U/L   CBC (+ platelets, no diff)    Collection Time: 06/05/23  4:56 PM   Result Value Ref Range    WBC Count 7.0 4.0 - 11.0 10e3/uL    RBC Count 4.39 3.80 - 5.20 10e6/uL    Hemoglobin 12.5 11.7 - 15.7 g/dL    Hematocrit 38.8 35.0 - 47.0 %    MCV 88 78 - 100 fL    MCH 28.5 26.5 - 33.0 pg    MCHC 32.2 31.5 - 36.5 g/dL    RDW 13.3 10.0 - 15.0 %    Platelet Count 228 150 - 450 10e3/uL   Hepatic function panel    Collection Time: 06/05/23  4:56 PM   Result Value Ref Range    Protein Total 7.0 6.4 - 8.3 g/dL    Albumin 4.1 3.5 - 5.2 g/dL    Bilirubin Total 0.8 <=1.2 mg/dL    Alkaline Phosphatase 176 (H) 35 - 104 U/L     (HH) 10 - 35 U/L     (H) 10 - 35 U/L    Bilirubin  Direct 0.47 (H) 0.00 - 0.30 mg/dL   Hepatic panel    Collection Time: 06/06/23  6:31 AM   Result Value Ref Range    Protein Total 6.7 6.4 - 8.3 g/dL    Albumin 3.7 3.5 - 5.2 g/dL    Bilirubin Total 0.9 <=1.2 mg/dL    Alkaline Phosphatase 222 (H) 35 - 104 U/L     (HH) 10 - 35 U/L     (HH) 10 - 35 U/L    Bilirubin Direct 0.30 0.00 - 0.30 mg/dL   Extra Purple Top Tube    Collection Time: 06/06/23  6:31 AM   Result Value Ref Range    Hold Specimen JIC    Glucose by meter    Collection Time: 06/06/23 12:09 PM   Result Value Ref Range    GLUCOSE BY METER POCT 101 (H) 70 - 99 mg/dL       All laboratory and imaging data in the past 24 hours reviewed  Serum Glucose range:   Recent Labs   Lab 06/06/23  1209 06/05/23  1656   * 104*     ABG: No lab results found in last 7 days.  CBC:   Recent Labs   Lab 06/05/23  1656   WBC 7.0   HGB 12.5   HCT 38.8   MCV 88        Chemistry:   Recent Labs   Lab 06/06/23  0631 06/05/23  1656   NA  --  141   POTASSIUM  --  3.8   CHLORIDE  --  105   CO2  --  24   BUN  --  11.6   CR  --  0.76   GFRESTIMATED  --  >90   NALLELY  --  9.3   PROTTOTAL 6.7 7.0   ALBUMIN 3.7 4.1   * 546*   * 379*   ALKPHOS 222* 176*   BILITOTAL 0.9 0.8     Coags:  No results for input(s): INR, PROTIME, PTT in the last 168 hours.    Invalid input(s): APTT  Cardiac Markers:  No results for input(s): CKTOTAL, TROPONINI in the last 168 hours.       MR Abdomen MRCP w/o & w Contrast    Result Date: 6/5/2023  EXAM: MR ABDOMEN MRCP W/O and W CONTRAST LOCATION: [facility] DATE/TIME: 6/5/2023 9:07 PM CDT INDICATION: Abnormal LFTs COMPARISON: None. TECHNIQUE: Routine MR liver/pancreas protocol including axial and coronal MRCP sequences. 2D and 3D reconstruction performed by MR technologist including MIP reconstruction and slab cholangiograms. If performed with contrast, additional dynamic T1 post IV contrast images. CONTRAST: 8 ml Gadavist FINDINGS: MRCP: There is  approximately 1.6 x 1.6 mm filling defect seen in the distal aspect of the common bile duct. There is no significant dilatation of the common bile duct which measures approximately 4.7 mm in greatest radial dimension. No evidence for intrahepatic bile duct dilatation. The gallbladder is surgically absent. LIVER: Normal PANCREAS: Normal ADDITIONAL FINDINGS: Minute simple cyst left kidney and no follow-up is recommended.     IMPRESSION: 1.  The gallbladder is surgically absent. 2.  1.6 x 1.6 mm nonobstructive stone is seen in the distal aspect of the common bile duct.      Latest radiology report personally reviewed.    Note created using dragon voice recognition software so sounds alike errors may have escaped editing.      06/06/2023   Wallace Hickman MD  Hospitalist, [facility]  Pager: [phone]

## 2023-06-07 VITALS
HEART RATE: 67 BPM | RESPIRATION RATE: 17 BRPM | DIASTOLIC BLOOD PRESSURE: 62 MMHG | SYSTOLIC BLOOD PRESSURE: 113 MMHG | WEIGHT: 180.5 LBS | HEIGHT: 66 IN | TEMPERATURE: 97.7 F | OXYGEN SATURATION: 98 % | BODY MASS INDEX: 29.01 KG/M2

## 2023-06-07 LAB
ALBUMIN SERPL BCG-MCNC: 3.6 G/DL (ref 3.5–5.2)
ALP SERPL-CCNC: 242 U/L (ref 35–104)
ALT SERPL W P-5'-P-CCNC: 584 U/L (ref 10–35)
ANION GAP SERPL CALCULATED.3IONS-SCNC: 9 MMOL/L (ref 7–15)
AST SERPL W P-5'-P-CCNC: 249 U/L (ref 10–35)
BILIRUB DIRECT SERPL-MCNC: <0.2 MG/DL (ref 0–0.3)
BILIRUB SERPL-MCNC: 0.6 MG/DL
BUN SERPL-MCNC: 4.5 MG/DL (ref 6–20)
CALCIUM SERPL-MCNC: 8.8 MG/DL (ref 8.6–10)
CHLORIDE SERPL-SCNC: 106 MMOL/L (ref 98–107)
CREAT SERPL-MCNC: 0.64 MG/DL (ref 0.51–0.95)
DEPRECATED HCO3 PLAS-SCNC: 26 MMOL/L (ref 22–29)
GFR SERPL CREATININE-BSD FRML MDRD: >90 ML/MIN/1.73M2
GLUCOSE SERPL-MCNC: 108 MG/DL (ref 70–99)
HOLD SPECIMEN: NORMAL
POTASSIUM SERPL-SCNC: 4.1 MMOL/L (ref 3.4–5.3)
PROT SERPL-MCNC: 6.3 G/DL (ref 6.4–8.3)
SODIUM SERPL-SCNC: 141 MMOL/L (ref 136–145)

## 2023-06-07 PROCEDURE — 96361 HYDRATE IV INFUSION ADD-ON: CPT

## 2023-06-07 PROCEDURE — 82248 BILIRUBIN DIRECT: CPT | Performed by: INTERNAL MEDICINE

## 2023-06-07 PROCEDURE — G0378 HOSPITAL OBSERVATION PER HR: HCPCS

## 2023-06-07 PROCEDURE — 258N000003 HC RX IP 258 OP 636: Performed by: INTERNAL MEDICINE

## 2023-06-07 PROCEDURE — 99239 HOSP IP/OBS DSCHRG MGMT >30: CPT | Performed by: STUDENT IN AN ORGANIZED HEALTH CARE EDUCATION/TRAINING PROGRAM

## 2023-06-07 PROCEDURE — 36415 COLL VENOUS BLD VENIPUNCTURE: CPT | Performed by: INTERNAL MEDICINE

## 2023-06-07 RX ADMIN — SODIUM CHLORIDE, POTASSIUM CHLORIDE, SODIUM LACTATE AND CALCIUM CHLORIDE: 600; 310; 30; 20 INJECTION, SOLUTION INTRAVENOUS at 03:21

## 2023-06-07 ASSESSMENT — ACTIVITIES OF DAILY LIVING (ADL)
ADLS_ACUITY_SCORE: 18

## 2023-06-07 NOTE — PLAN OF CARE
Observation goals  PRIOR TO DISCHARGE         Diagnostic tests and consults completed and resulted: NOT MET, LFT has been elevated at ALT-681 and AST-603,  hepatic panel tomorrow morning.     Vital signs normal or at patient baseline:  MET, patient is vitally stable.     Adequate pain control on oral analgesics:  MET, patient has been complaining of pain at the abdominal area, oral oxycodone given and somewhat helpful    Nurse to notify provider when observation goals have been met and patient is   ready for discharge.

## 2023-06-07 NOTE — PLAN OF CARE
Patient is discharge to home. Patient is stable, alert and oriented. Patient received discharge instructions and questions answered. Patient has all her belongings. Patient is walking accompanied by staff. Patient said she will dry herself home.

## 2023-06-07 NOTE — PLAN OF CARE
"PRIMARY DIAGNOSIS: \"GENERIC\" NURSING  OUTPATIENT/OBSERVATION GOALS TO BE MET BEFORE DISCHARGE:  ADLs back to baseline: Yes    Activity and level of assistance: Ambulating independently.    Pain status: Pain free.    Return to near baseline physical activity: Yes     Discharge Planner Nurse   Safe discharge environment identified: Yes  Barriers to discharge: Yes       Entered by: Shon Hendricks RN 06/07/2023 9:12 AM   Lab; AST, ALT trending  Please review provider order for any additional goals.   Nurse to notify provider when observation goals have been met and patient is ready for discharge.Goal Outcome Evaluation:                        "

## 2023-06-07 NOTE — PROGRESS NOTES
[facility] DIGESTIVE HEALTH PROGRESS NOTE      Subjective:              She is feeling better today. No abd pain. Tolerated breakfast well. Wants to go home.      Objective:                Body mass index is 29.13 kg/m .  Vital signs in last 24 hrs;  Temp:  [97.4  F (36.3  C)-98.5  F (36.9  C)] 97.7  F (36.5  C)  Pulse:  [56-89] 67  Resp:  [12-29] 17  BP: ()/(55-82) 113/62  SpO2:  [94 %-100 %] 98 %    Physical Exam:   General: Comfortable appearing. Awake.   Cardiovascular: Regular rate.   Chest: Non-labored breathing. Symmetric chest rise.   Abdomen: soft, non-tender, non-distended  Neurologic: Alert, no focal defects.     Current Labs:  CMP Results: Recent Labs   Lab 06/07/23  0649 06/06/23 2013 06/06/23  1209 06/06/23  0631 06/05/23  1656     --   --   --  141   POTASSIUM 4.1  --   --   --  3.8   CHLORIDE 106  --   --   --  105   CO2 26  --   --   --  24   * 153* 101*  --  104*   BUN 4.5*  --   --   --  11.6   CR 0.64  --   --   --  0.76   BILITOTAL 0.6  --   --  0.9 0.8   ALKPHOS 242*  --   --  222* 176*   *  --   --  681* 379*   *  --   --  603* 546*      CBC  Recent Labs   Lab 06/05/23  1656   WBC 7.0   RBC 4.39   HGB 12.5   HCT 38.8   MCV 88   RDW 13.3        INRNo lab results found in last 7 days.   LIPASE  Recent Labs   Lab 06/05/23  1656   LIPASE 40       Relevant Imaging:  No new abd imaging.     Assessment:       #Choledocholithiasis  - s/p ERCP 6/6 with removal of stones.   - Now asymptomatic.   - is s/p svetlana.   - Tolerating breakfast.     Plan:     - Can advance diet as tolerated.  - Avoid anticoagulation for 72 hours  - Ok with discharge home     20 minutes of total time was spent providing patient care, including patient evaluation, reviewing documentation/test results and .           Polo Mcmanus DO  Thank you for the opportunity to participate in the care of this patient.   Please feel free to call me with any questions or concerns.  Phone number  [phone].

## 2023-06-07 NOTE — PLAN OF CARE
Observation goals  PRIOR TO DISCHARGE          Diagnostic tests and consults completed and resulted: NOT MET, LFT has been elevated at ALT-681 and AST-603,  hepatic panel this morning.      Vital signs normal or at patient baseline:  MET, patient is vitally stable.      Adequate pain control on oral analgesics:  MET, patient has been complaining of pain at the abdominal area, oral oxycodone given and somewhat helpful     Nurse to notify provider when observation goals have been met and patient is   ready for discharge.

## 2023-06-07 NOTE — PROGRESS NOTES
Care Management Follow Up    Length of Stay (days): 0    Expected Discharge Date: 06/07/2023     Concerns to be Addressed: discharge planning     Patient plan of care discussed at interdisciplinary rounds: Yes    Anticipated Discharge Disposition: Home     Anticipated Discharge Services: None  Anticipated Discharge DME: None    Education Provided on the Discharge Plan:  yes  Patient/Family in Agreement with the Plan: yes    Referrals Placed by CM/SW:  None  Private pay costs discussed: Not applicable    Additional Information:  Chart reviewed. Plan to discharge home with family transport, no anticipated CM needs.      DANIEL Shipley         Statement Selected

## 2023-06-07 NOTE — DISCHARGE SUMMARY
"[facility]  Hospitalist Discharge Summary      Date of Admission:  6/5/2023  Date of Discharge:  6/7/2023  Discharging Provider: Bianca Stein MD  Discharge Service: Hospitalist Service    Discharge Diagnoses   Choledocholithiasis    Clinically Significant Risk Factors     # Overweight: Estimated body mass index is 29.13 kg/m  as calculated from the following:    Height as of this encounter: 1.676 m (5' 6\").    Weight as of this encounter: 81.9 kg (180 lb 8 oz).       Follow-ups Needed After Discharge   Follow-up Appointments     Follow-up and recommended labs and tests       Follow up with primary care provider (PCP), Kristie Diaz, within 7 days for   hospital follow- up and regarding new diagnosis.  The following labs/tests   are recommended: LFTs in 3-5 days.             Unresulted Labs Ordered in the Past 30 Days of this Admission     No orders found from 5/6/2023 to 6/6/2023.          Discharge Disposition   Discharged to home  Condition at discharge: Stable    Hospital Course    Giovanny Black is a 32 year old female admitted on 06/05/23 for abdominal pain and elevated LFTs. She had lap svetlana done on 05/12/23. MRCP showed a 1.6 x1.6 mm nonobstructive stone in the distal aspect of the common bile duct.  ERCP was done on 06/06/23- choledocholithiasis was found and complete removal was done by biliary sphincterotomy and balloon extraction. Diet was advanced to regular diet and patient has no abdominal pain and tolerating diet. Patient is clinically and hemodynamically stable for discharge to home. Medication reconciliation was done. Follow up appointments and recommendations as shown below. Patient verbalized understanding and agreed to plan of care. All questions answered.    Consultations This Hospital Stay   GASTROENTEROLOGY IP CONSULT    Code Status   Full Code    Time Spent on this Encounter   I, Bianca Stein MD, personally saw the patient today and spent greater than 30 " minutes discharging this patient.       Bianca Stein MD  [place] [id number] [facility] [address]  Phone: [phone]  Fax: [phone]  ______________________________________________________________________    Physical Exam   Vital Signs: Temp: 97.7  F (36.5  C) Temp src: Oral BP: 113/62 Pulse: 67   Resp: 17 SpO2: 98 % O2 Device: None (Room air)    Weight: 180 lbs 8 oz    GEN: Alert and oriented. Not in acute distress.  HEENT: Atraumatic, mucous membrane- moist and pink.  Chest: Bilateral air entry.  CVS: S1S2 regular. No murmurs, rubs or gallops.  Abdomen: Soft. Non-tender, non-distended. No organomegaly. No guarding or rigidity. Bowel sounds active.   Extremities: No pedal edema.  CNS: No focal neurologic deficit. No involuntary movements.  Skin: No skin rash, no cyanosis or clubbing.        Primary Care Physician   Kristie Diaz    Discharge Orders      Reason for your hospital stay    Choledocholithiasis     Activity    Your activity upon discharge: activity as tolerated     Discharge Instructions    Avoid anticoagulations for 72 hours.     Follow-up and recommended labs and tests     Follow up with primary care provider (PCP), Kristie Diaz, within 7 days for hospital follow- up and regarding new diagnosis.  The following labs/tests are recommended: LFTs in 3-5 days.     Diet    Follow this diet upon discharge: Orders Placed This Encounter      Room Service      Regular Diet Adult       Significant Results and Procedures   Most Recent 2 LFT's:Recent Labs   Lab Test 06/07/23  0649 06/06/23  0631   * 603*   * 681*   ALKPHOS 242* 222*   BILITOTAL 0.6 0.9       Discharge Medications   Current Discharge Medication List      CONTINUE these medications which have NOT CHANGED    Details   HYDROcodone-acetaminophen (NORCO) 5-325 MG tablet Take 1 tablet by mouth every 4 hours as needed for moderate to severe pain  Qty: 12 tablet, Refills: 0    Associated  Diagnoses: Postoperative pain           Allergies   No Known Allergies

## 2023-06-07 NOTE — PLAN OF CARE
Problem: Plan of Care - These are the overarching goals to be used throughout the patient stay.    Goal: Plan of Care Review  Description: The Plan of Care Review/Shift note should be completed every shift.  The Outcome Evaluation is a brief statement about your assessment that the patient is improving, declining, or no change.  This information will be displayed automatically on your shift note.  Outcome: Progressing   Patient is alert and oriented. Vitally stable. Denied pain or nausea. Ate well at breakfast per regular diet.  Patient seen and clear by GI. Monitor.

## 2023-06-15 ENCOUNTER — OFFICE VISIT (OUTPATIENT)
Dept: INTERNAL MEDICINE | Facility: CLINIC | Age: 33
End: 2023-06-15
Payer: COMMERCIAL

## 2023-06-15 VITALS
WEIGHT: 180 LBS | RESPIRATION RATE: 16 BRPM | TEMPERATURE: 98.1 F | HEART RATE: 85 BPM | DIASTOLIC BLOOD PRESSURE: 64 MMHG | BODY MASS INDEX: 28.93 KG/M2 | OXYGEN SATURATION: 98 % | HEIGHT: 66 IN | SYSTOLIC BLOOD PRESSURE: 116 MMHG

## 2023-06-15 DIAGNOSIS — K80.50 CHOLEDOCHOLITHIASIS: Primary | ICD-10-CM

## 2023-06-15 PROCEDURE — 80076 HEPATIC FUNCTION PANEL: CPT | Performed by: NURSE PRACTITIONER

## 2023-06-15 PROCEDURE — 36415 COLL VENOUS BLD VENIPUNCTURE: CPT | Performed by: NURSE PRACTITIONER

## 2023-06-15 PROCEDURE — 99213 OFFICE O/P EST LOW 20 MIN: CPT | Performed by: NURSE PRACTITIONER

## 2023-06-15 ASSESSMENT — PAIN SCALES - GENERAL: PAINLEVEL: NO PAIN (0)

## 2023-06-15 NOTE — PROGRESS NOTES
"  Assessment & Plan   Problem List Items Addressed This Visit    None  Visit Diagnoses     Choledocholithiasis    -  Primary    Relevant Orders    Hepatic panel (Albumin, ALT, AST, Bili, Alk Phos, TP)         S/p MRCP stone extraction. Doing well since discharge. Need to repeat LFTs today. If trending close to normal, no further follow up needed unless symptoms change.       Post Medication Reconciliation Status:  Discharge medications reconciled and changed, see notes/orders    BMI:   Estimated body mass index is 29.05 kg/m  as calculated from the following:    Height as of this encounter: 1.676 m (5' 6\").    Weight as of this encounter: 81.6 kg (180 lb).         Kristie Diaz NP  [facility] BENIGNO Baltazar is a 32 year old, presenting for the following health issues:  Hospital F/U        6/15/2023     1:36 PM   Additional Questions   Roomed by Star WILL MA   Accompanied by EDGAR     History of Present Illness       Reason for visit:  Check the levels of my liver after galbladder removal    She eats 2-3 servings of fruits and vegetables daily.She consumes 1 sweetened beverage(s) daily.She exercises with enough effort to increase her heart rate 9 or less minutes per day.  She exercises with enough effort to increase her heart rate 3 or less days per week.   She is taking medications regularly.         Hospital Follow-up Visit:    Hospital/Nursing Home/IP Rehab Facility: [facility]  Date of Admission: 06/05/2023  Date of Discharge: 06/07/2023  Reason(s) for Admission: Elevated LFTs    Was your hospitalization related to COVID-19? No   Problems taking medications regularly:  None  Medication changes since discharge: None    She is s/p lap chol on 5/12/23. She presented to the ED and was admitted on 6/5 with abdominal pain and elevated LFTs, found to have nonobstructive stone in the distal aspect of the CBD. Since discharge she has been doing well. BMs are regular. " "Tolerating a normal diet. No abdominal pain.           Objective    /64 (BP Location: Right arm, Patient Position: Sitting, Cuff Size: Adult Regular)   Pulse 85   Temp 98.1  F (36.7  C) (Oral)   Resp 16   Ht 1.676 m (5' 6\")   Wt 81.6 kg (180 lb)   LMP 06/01/2023 (Approximate)   SpO2 98%   BMI 29.05 kg/m    Body mass index is 29.05 kg/m .     Wt Readings from Last 5 Encounters:   06/15/23 81.6 kg (180 lb)   06/05/23 81.9 kg (180 lb 8 oz)   05/12/23 79.4 kg (175 lb)   04/20/23 81.6 kg (180 lb)   04/13/23 77.1 kg (170 lb)       Physical Exam   GENERAL: healthy, alert and no distress  ENT: non-icteric  ABDOMEN: soft, abdominal incisions are healing well. Normal bowel sounds.   SKIN: normal skin color              "

## 2023-08-01 NOTE — PROGRESS NOTES
Care Management Follow Up    Length of Stay (days): 0    Expected Discharge Date: 06/06/2023     Concerns to be Addressed: discharge planning     Patient plan of care discussed at interdisciplinary rounds: Yes    Anticipated Discharge Disposition: Home     Anticipated Discharge Services: None  Anticipated Discharge DME: None    Patient/family educated on Medicare website which has current facility and service quality ratings:  n/a  Education Provided on the Discharge Plan:  yes  Patient/Family in Agreement with the Plan: yes    Referrals Placed by CM/SW:  n/a  Private pay costs discussed: Not applicable    Additional Information:  SW completed chart assessment due to low readmissions score. Pt comes from home with spouse, and appears to be independent at baseline. Anticipate no needs from care management at discharge. Pt to follow with PCP if needs arise post discharge.    CM to continue to follow through hospitalization.  9:09 AM    DAYSI Valencia  6/6/2023       Continue OT POC.  CGA for ambulation 60ft with RW. SBA for bed mobility.

## 2023-10-20 ENCOUNTER — HOSPITAL ENCOUNTER (OUTPATIENT)
Dept: MAMMOGRAPHY | Facility: CLINIC | Age: 33
Discharge: HOME | End: 2023-10-20
Attending: NURSE PRACTITIONER | Admitting: NURSE PRACTITIONER
Payer: COMMERCIAL

## 2023-10-20 DIAGNOSIS — Z12.31 VISIT FOR SCREENING MAMMOGRAM: ICD-10-CM

## 2023-10-20 PROCEDURE — 77067 SCR MAMMO BI INCL CAD: CPT

## 2024-02-06 ENCOUNTER — MEDICAL CORRESPONDENCE (OUTPATIENT)
Dept: HEALTH INFORMATION MANAGEMENT | Facility: CLINIC | Age: 34
End: 2024-02-06
Payer: COMMERCIAL

## 2024-02-06 ENCOUNTER — TRANSFERRED RECORDS (OUTPATIENT)
Dept: HEALTH INFORMATION MANAGEMENT | Facility: CLINIC | Age: 34
End: 2024-02-06
Payer: COMMERCIAL

## 2024-02-07 ENCOUNTER — TRANSCRIBE ORDERS (OUTPATIENT)
Dept: MATERNAL FETAL MEDICINE | Facility: HOSPITAL | Age: 34
End: 2024-02-07
Payer: COMMERCIAL

## 2024-02-07 DIAGNOSIS — O26.90 PREGNANCY RELATED CONDITION, ANTEPARTUM: Primary | ICD-10-CM

## 2024-02-07 DIAGNOSIS — O30.009 TWIN PREGNANCY: ICD-10-CM

## 2024-02-21 ENCOUNTER — PRE VISIT (OUTPATIENT)
Dept: MATERNAL FETAL MEDICINE | Facility: CLINIC | Age: 34
End: 2024-02-21
Payer: COMMERCIAL

## 2024-02-27 ENCOUNTER — TRANSFERRED RECORDS (OUTPATIENT)
Dept: HEALTH INFORMATION MANAGEMENT | Facility: CLINIC | Age: 34
End: 2024-02-27
Payer: COMMERCIAL

## 2024-02-28 ENCOUNTER — OFFICE VISIT (OUTPATIENT)
Dept: MATERNAL FETAL MEDICINE | Facility: CLINIC | Age: 34
End: 2024-02-28
Attending: ADVANCED PRACTICE MIDWIFE
Payer: COMMERCIAL

## 2024-02-28 ENCOUNTER — HOSPITAL ENCOUNTER (OUTPATIENT)
Dept: ULTRASOUND IMAGING | Facility: CLINIC | Age: 34
Discharge: HOME | End: 2024-02-28
Attending: ADVANCED PRACTICE MIDWIFE
Payer: COMMERCIAL

## 2024-02-28 VITALS — DIASTOLIC BLOOD PRESSURE: 65 MMHG | SYSTOLIC BLOOD PRESSURE: 122 MMHG | HEART RATE: 83 BPM | OXYGEN SATURATION: 99 %

## 2024-02-28 DIAGNOSIS — O26.90 PREGNANCY RELATED CONDITION, ANTEPARTUM: ICD-10-CM

## 2024-02-28 DIAGNOSIS — O09.292 HISTORY OF PRETERM PREMATURE RUPTURE OF MEMBRANES (PROM) IN PREVIOUS PREGNANCY, CURRENTLY PREGNANT IN SECOND TRIMESTER: ICD-10-CM

## 2024-02-28 DIAGNOSIS — O30.009 TWIN PREGNANCY: ICD-10-CM

## 2024-02-28 DIAGNOSIS — O30.042 DICHORIONIC DIAMNIOTIC TWIN PREGNANCY IN SECOND TRIMESTER: Primary | ICD-10-CM

## 2024-02-28 PROCEDURE — 76805 OB US >/= 14 WKS SNGL FETUS: CPT | Mod: 26 | Performed by: OBSTETRICS & GYNECOLOGY

## 2024-02-28 PROCEDURE — 76805 OB US >/= 14 WKS SNGL FETUS: CPT

## 2024-02-28 PROCEDURE — 76810 OB US >/= 14 WKS ADDL FETUS: CPT | Mod: 26 | Performed by: OBSTETRICS & GYNECOLOGY

## 2024-02-28 PROCEDURE — 76817 TRANSVAGINAL US OBSTETRIC: CPT | Mod: 26 | Performed by: OBSTETRICS & GYNECOLOGY

## 2024-02-28 PROCEDURE — 99203 OFFICE O/P NEW LOW 30 MIN: CPT | Mod: 25 | Performed by: OBSTETRICS & GYNECOLOGY

## 2024-02-28 NOTE — NURSING NOTE
Patient presents to [facility] for 2/3 complete at 16w1d due to Di Di twins, hx PPROM at 36w. Denies LOF, vaginal bleeding or cramping/contractions. SBAR given to M MD, see their note in Epic.

## 2024-02-28 NOTE — PROGRESS NOTES
"I was asked by Kassidy Merida to see Giovanny Black for a consult for previous PTD now with a twin gestation.    The patient is a 32 yo  whose first pregnancy delivered at 36w4d after PROM. The pregnancy was also complicated by cholelithiasis without cholecystitis.    The patient is on vaginal progesterone and IM progesterone. The patient PMHx is otherwise unremarkable.     The patient is at increased risk for recurrent PTD both based on history as well as twin gestation. The recurrence risk to PTD with a banda pregnancy is 20%. Given twins the following was discussed:    1) Twin pregnancies are associated with a three?fold greater  mortality than banda pregnancies. Prematurity is a main contributor, with 50% of twin pregnancies delivering before 37 weeks and 10% delivering before 32 weeks of gestation.  2) The etiology of  delivery in twin pregnancies is likely multifactorial and different from that of singletons.  3) Cervical cerclage reduces  birth rates in singletons but has mixed results in twins with some studies showing harm.  4) The use of progesterone to prevent  birth in singletons has conflicting results and has not been proven to prevent  birth in twins.    We therefore recommend the following:  - Serial cervix length every 2 weeks from 16-24 weeks.  - Consider cerclage for cervix < 25 mm and/or cervix dilation after additional counseling.  - We do not recommend to continue IM progesterone based on lack of evidence for benefit.  - Although vaginal progesterone has low risk there may be no benefit in twins. The patient may wish to continue vaginal progesterone.    The patient is scheduled to return to [facility] in 2 weeks to reassess the cervix length and in 4 weeks for a comprehensive ultrasound.    The total time spent in all  patient care activities on the day of this visit was 30 minutes    Please see \"Imaging\" tab under \"Chart Review\" for details of " today's US at the [facility].    Rocky Crews MD  Maternal-Fetal Medicine

## 2024-03-06 DIAGNOSIS — O30.042 DICHORIONIC DIAMNIOTIC TWIN PREGNANCY IN SECOND TRIMESTER: Primary | ICD-10-CM

## 2024-03-15 ENCOUNTER — OFFICE VISIT (OUTPATIENT)
Dept: MATERNAL FETAL MEDICINE | Facility: CLINIC | Age: 34
End: 2024-03-15
Attending: STUDENT IN AN ORGANIZED HEALTH CARE EDUCATION/TRAINING PROGRAM
Payer: COMMERCIAL

## 2024-03-15 ENCOUNTER — HOSPITAL ENCOUNTER (OUTPATIENT)
Dept: ULTRASOUND IMAGING | Facility: CLINIC | Age: 34
Discharge: HOME | End: 2024-03-15
Attending: STUDENT IN AN ORGANIZED HEALTH CARE EDUCATION/TRAINING PROGRAM
Payer: COMMERCIAL

## 2024-03-15 DIAGNOSIS — O09.292 HISTORY OF PRETERM PREMATURE RUPTURE OF MEMBRANES (PROM) IN PREVIOUS PREGNANCY, CURRENTLY PREGNANT IN SECOND TRIMESTER: ICD-10-CM

## 2024-03-15 DIAGNOSIS — O30.042 DICHORIONIC DIAMNIOTIC TWIN PREGNANCY IN SECOND TRIMESTER: ICD-10-CM

## 2024-03-15 DIAGNOSIS — O09.292 HISTORY OF PRETERM PREMATURE RUPTURE OF MEMBRANES (PROM) IN PREVIOUS PREGNANCY, CURRENTLY PREGNANT IN SECOND TRIMESTER: Primary | ICD-10-CM

## 2024-03-15 PROCEDURE — 76817 TRANSVAGINAL US OBSTETRIC: CPT

## 2024-03-15 PROCEDURE — 76817 TRANSVAGINAL US OBSTETRIC: CPT | Mod: 26 | Performed by: STUDENT IN AN ORGANIZED HEALTH CARE EDUCATION/TRAINING PROGRAM

## 2024-03-15 NOTE — NURSING NOTE
Patient presents to [facility] for [facility] at 18w3d due to hx PPROM at 36 weeks. Denies LOF, vaginal bleeding or cramping/contractions. SBAR given to [facility], see their note in Epic.

## 2024-03-15 NOTE — PROGRESS NOTES
"Please see \"Imaging\" tab under \"Chart Review\" for details of today's visit.    Jamaica Pereyra    "

## 2024-04-02 ENCOUNTER — OFFICE VISIT (OUTPATIENT)
Dept: MATERNAL FETAL MEDICINE | Facility: CLINIC | Age: 34
End: 2024-04-02
Attending: OBSTETRICS & GYNECOLOGY
Payer: COMMERCIAL

## 2024-04-02 ENCOUNTER — HOSPITAL ENCOUNTER (OUTPATIENT)
Dept: ULTRASOUND IMAGING | Facility: CLINIC | Age: 34
Discharge: HOME | End: 2024-04-02
Attending: OBSTETRICS & GYNECOLOGY
Payer: COMMERCIAL

## 2024-04-02 DIAGNOSIS — O30.042 DICHORIONIC DIAMNIOTIC TWIN PREGNANCY IN SECOND TRIMESTER: Primary | ICD-10-CM

## 2024-04-02 DIAGNOSIS — O30.042 DICHORIONIC DIAMNIOTIC TWIN PREGNANCY IN SECOND TRIMESTER: ICD-10-CM

## 2024-04-02 DIAGNOSIS — O09.292 HISTORY OF PRETERM PREMATURE RUPTURE OF MEMBRANES (PROM) IN PREVIOUS PREGNANCY, CURRENTLY PREGNANT IN SECOND TRIMESTER: ICD-10-CM

## 2024-04-02 PROCEDURE — 99213 OFFICE O/P EST LOW 20 MIN: CPT | Mod: 25 | Performed by: STUDENT IN AN ORGANIZED HEALTH CARE EDUCATION/TRAINING PROGRAM

## 2024-04-02 PROCEDURE — 76811 OB US DETAILED SNGL FETUS: CPT | Mod: 26 | Performed by: STUDENT IN AN ORGANIZED HEALTH CARE EDUCATION/TRAINING PROGRAM

## 2024-04-02 PROCEDURE — 76817 TRANSVAGINAL US OBSTETRIC: CPT | Mod: 26 | Performed by: STUDENT IN AN ORGANIZED HEALTH CARE EDUCATION/TRAINING PROGRAM

## 2024-04-02 PROCEDURE — 76817 TRANSVAGINAL US OBSTETRIC: CPT

## 2024-04-02 PROCEDURE — 76812 OB US DETAILED ADDL FETUS: CPT | Mod: 26 | Performed by: STUDENT IN AN ORGANIZED HEALTH CARE EDUCATION/TRAINING PROGRAM

## 2024-04-02 NOTE — PROGRESS NOTES
Please see the full imaging report from the ViewPoint program under the imaging tab.    Kelly Pereyra MD  Maternal Fetal Medicine

## 2024-04-16 ENCOUNTER — ANCILLARY PROCEDURE (OUTPATIENT)
Dept: ULTRASOUND IMAGING | Facility: HOSPITAL | Age: 34
End: 2024-04-16
Attending: OBSTETRICS & GYNECOLOGY
Payer: COMMERCIAL

## 2024-04-16 ENCOUNTER — OFFICE VISIT (OUTPATIENT)
Dept: MATERNAL FETAL MEDICINE | Facility: HOSPITAL | Age: 34
End: 2024-04-16
Attending: OBSTETRICS & GYNECOLOGY
Payer: COMMERCIAL

## 2024-04-16 DIAGNOSIS — O09.892 HISTORY OF PRETERM DELIVERY, CURRENTLY PREGNANT IN SECOND TRIMESTER: ICD-10-CM

## 2024-04-16 DIAGNOSIS — O09.292 HISTORY OF PRETERM PREMATURE RUPTURE OF MEMBRANES (PROM) IN PREVIOUS PREGNANCY, CURRENTLY PREGNANT IN SECOND TRIMESTER: ICD-10-CM

## 2024-04-16 DIAGNOSIS — O30.042 DICHORIONIC DIAMNIOTIC TWIN PREGNANCY IN SECOND TRIMESTER: Primary | ICD-10-CM

## 2024-04-16 PROCEDURE — 99207 PR NO CHARGE LOS: CPT | Performed by: OBSTETRICS & GYNECOLOGY

## 2024-04-16 PROCEDURE — 76817 TRANSVAGINAL US OBSTETRIC: CPT | Mod: 26 | Performed by: OBSTETRICS & GYNECOLOGY

## 2024-04-16 PROCEDURE — 76817 TRANSVAGINAL US OBSTETRIC: CPT

## 2024-04-16 NOTE — PROGRESS NOTES
Please see the imaging tab for details of the ultrasound performed today.    Laura Celaya MD  Specialist in Maternal-Fetal Medicine

## 2024-04-16 NOTE — NURSING NOTE
Giovanny Black is a  at 23w0d who presents to [facility] for scheduled cervical length.  SBAR given to Dr. Celaya, see note in Epic.

## 2024-04-30 ENCOUNTER — ANCILLARY PROCEDURE (OUTPATIENT)
Dept: ULTRASOUND IMAGING | Facility: HOSPITAL | Age: 34
End: 2024-04-30
Attending: OBSTETRICS & GYNECOLOGY
Payer: COMMERCIAL

## 2024-04-30 ENCOUNTER — OFFICE VISIT (OUTPATIENT)
Dept: MATERNAL FETAL MEDICINE | Facility: HOSPITAL | Age: 34
End: 2024-04-30
Attending: OBSTETRICS & GYNECOLOGY
Payer: COMMERCIAL

## 2024-04-30 DIAGNOSIS — O30.042 DICHORIONIC DIAMNIOTIC TWIN PREGNANCY IN SECOND TRIMESTER: Primary | ICD-10-CM

## 2024-04-30 DIAGNOSIS — O09.892 HISTORY OF PRETERM DELIVERY, CURRENTLY PREGNANT IN SECOND TRIMESTER: ICD-10-CM

## 2024-04-30 DIAGNOSIS — O30.042 DICHORIONIC DIAMNIOTIC TWIN PREGNANCY IN SECOND TRIMESTER: ICD-10-CM

## 2024-04-30 DIAGNOSIS — O43.192 MARGINAL INSERTION OF UMBILICAL CORD AFFECTING MANAGEMENT OF MOTHER IN SECOND TRIMESTER: ICD-10-CM

## 2024-04-30 PROCEDURE — 76816 OB US FOLLOW-UP PER FETUS: CPT | Mod: 59

## 2024-04-30 PROCEDURE — 76816 OB US FOLLOW-UP PER FETUS: CPT | Mod: 26 | Performed by: OBSTETRICS & GYNECOLOGY

## 2024-04-30 PROCEDURE — 99207 PR NO CHARGE LOS: CPT | Performed by: OBSTETRICS & GYNECOLOGY

## 2024-04-30 NOTE — PROGRESS NOTES
The patient was seen for an ultrasound in the Maternal-Fetal Medicine Center at the [facility] today.  For a detailed report of the ultrasound examination, please see the ultrasound report which can be found under the imaging tab.    If you have questions regarding today's evaluation or if we can be of further service, please contact the Maternal-Fetal Medicine Center.    Angela Lan MD  , OB/GYN  Maternal-Fetal Medicine  [phone] (Pager)

## 2024-04-30 NOTE — NURSING NOTE
Giovanny Black is a  at 25w0d who presents to [facility] for scheduled TWIN growth ultrasound. Pt reports positive fetal movement.  SBAR given to Dr. Lan, see note in Epic.

## 2024-05-11 ENCOUNTER — HOSPITAL ENCOUNTER (EMERGENCY)
Facility: HOSPITAL | Age: 34
Discharge: HOME | End: 2024-05-12
Attending: EMERGENCY MEDICINE | Admitting: EMERGENCY MEDICINE
Payer: COMMERCIAL

## 2024-05-11 DIAGNOSIS — R19.7 NAUSEA VOMITING AND DIARRHEA: ICD-10-CM

## 2024-05-11 DIAGNOSIS — R11.2 NAUSEA VOMITING AND DIARRHEA: ICD-10-CM

## 2024-05-11 LAB
ANION GAP SERPL CALCULATED.3IONS-SCNC: 14 MMOL/L (ref 7–15)
BUN SERPL-MCNC: 7.4 MG/DL (ref 6–20)
CALCIUM SERPL-MCNC: 8.7 MG/DL (ref 8.6–10)
CHLORIDE SERPL-SCNC: 102 MMOL/L (ref 98–107)
CREAT SERPL-MCNC: 0.57 MG/DL (ref 0.51–0.95)
DEPRECATED HCO3 PLAS-SCNC: 18 MMOL/L (ref 22–29)
EGFRCR SERPLBLD CKD-EPI 2021: >90 ML/MIN/1.73M2
GLUCOSE SERPL-MCNC: 107 MG/DL (ref 70–99)
MAGNESIUM SERPL-MCNC: 2 MG/DL (ref 1.7–2.3)
POTASSIUM SERPL-SCNC: 4.2 MMOL/L (ref 3.4–5.3)
SODIUM SERPL-SCNC: 134 MMOL/L (ref 135–145)

## 2024-05-11 PROCEDURE — 96361 HYDRATE IV INFUSION ADD-ON: CPT

## 2024-05-11 PROCEDURE — 80048 BASIC METABOLIC PNL TOTAL CA: CPT | Performed by: EMERGENCY MEDICINE

## 2024-05-11 PROCEDURE — 96374 THER/PROPH/DIAG INJ IV PUSH: CPT

## 2024-05-11 PROCEDURE — 250N000011 HC RX IP 250 OP 636: Performed by: EMERGENCY MEDICINE

## 2024-05-11 PROCEDURE — 258N000003 HC RX IP 258 OP 636: Performed by: EMERGENCY MEDICINE

## 2024-05-11 PROCEDURE — 99284 EMERGENCY DEPT VISIT MOD MDM: CPT | Mod: 25

## 2024-05-11 PROCEDURE — 83735 ASSAY OF MAGNESIUM: CPT | Performed by: EMERGENCY MEDICINE

## 2024-05-11 PROCEDURE — 36415 COLL VENOUS BLD VENIPUNCTURE: CPT | Performed by: EMERGENCY MEDICINE

## 2024-05-11 RX ORDER — ONDANSETRON 2 MG/ML
8 INJECTION INTRAMUSCULAR; INTRAVENOUS ONCE
Status: COMPLETED | OUTPATIENT
Start: 2024-05-11 | End: 2024-05-11

## 2024-05-11 RX ADMIN — SODIUM CHLORIDE 1000 ML: 9 INJECTION, SOLUTION INTRAVENOUS at 22:42

## 2024-05-11 RX ADMIN — ONDANSETRON 8 MG: 2 INJECTION INTRAMUSCULAR; INTRAVENOUS at 22:43

## 2024-05-11 ASSESSMENT — COLUMBIA-SUICIDE SEVERITY RATING SCALE - C-SSRS
2. HAVE YOU ACTUALLY HAD ANY THOUGHTS OF KILLING YOURSELF IN THE PAST MONTH?: NO
6. HAVE YOU EVER DONE ANYTHING, STARTED TO DO ANYTHING, OR PREPARED TO DO ANYTHING TO END YOUR LIFE?: NO
1. IN THE PAST MONTH, HAVE YOU WISHED YOU WERE DEAD OR WISHED YOU COULD GO TO SLEEP AND NOT WAKE UP?: NO

## 2024-05-11 ASSESSMENT — ACTIVITIES OF DAILY LIVING (ADL): ADLS_ACUITY_SCORE: 35

## 2024-05-12 ENCOUNTER — HOSPITAL ENCOUNTER (OUTPATIENT)
Facility: HOSPITAL | Age: 34
Discharge: HOME | End: 2024-05-13
Attending: INTERNAL MEDICINE | Admitting: INTERNAL MEDICINE
Payer: COMMERCIAL

## 2024-05-12 VITALS
SYSTOLIC BLOOD PRESSURE: 119 MMHG | WEIGHT: 215 LBS | RESPIRATION RATE: 17 BRPM | HEART RATE: 87 BPM | TEMPERATURE: 98.1 F | OXYGEN SATURATION: 95 % | DIASTOLIC BLOOD PRESSURE: 62 MMHG | BODY MASS INDEX: 34.7 KG/M2

## 2024-05-12 PROBLEM — R50.9 FEVER: Status: ACTIVE | Noted: 2024-05-12

## 2024-05-12 PROBLEM — R05.9 COUGH: Status: ACTIVE | Noted: 2024-05-12

## 2024-05-12 PROBLEM — R11.10 VOMITING: Status: ACTIVE | Noted: 2024-05-12

## 2024-05-12 PROBLEM — R19.7 DIARRHEA: Status: ACTIVE | Noted: 2024-05-12

## 2024-05-12 LAB
ALBUMIN SERPL BCG-MCNC: 3.2 G/DL (ref 3.5–5.2)
ALBUMIN UR-MCNC: NEGATIVE MG/DL
ALP SERPL-CCNC: 117 U/L (ref 40–150)
ALT SERPL W P-5'-P-CCNC: 109 U/L (ref 0–50)
ANION GAP SERPL CALCULATED.3IONS-SCNC: 11 MMOL/L (ref 7–15)
APPEARANCE UR: ABNORMAL
AST SERPL W P-5'-P-CCNC: 151 U/L (ref 0–45)
BACTERIA #/AREA URNS HPF: ABNORMAL /HPF
BASOPHILS # BLD AUTO: 0 10E3/UL (ref 0–0.2)
BASOPHILS NFR BLD AUTO: 0 %
BILIRUB SERPL-MCNC: 0.3 MG/DL
BILIRUB UR QL STRIP: NEGATIVE
BUN SERPL-MCNC: 5.7 MG/DL (ref 6–20)
CALCIUM SERPL-MCNC: 8 MG/DL (ref 8.6–10)
CHLORIDE SERPL-SCNC: 103 MMOL/L (ref 98–107)
COLOR UR AUTO: ABNORMAL
CREAT SERPL-MCNC: 0.59 MG/DL (ref 0.51–0.95)
DEPRECATED HCO3 PLAS-SCNC: 20 MMOL/L (ref 22–29)
EGFRCR SERPLBLD CKD-EPI 2021: >90 ML/MIN/1.73M2
EOSINOPHIL # BLD AUTO: 0.2 10E3/UL (ref 0–0.7)
EOSINOPHIL NFR BLD AUTO: 2 %
ERYTHROCYTE [DISTWIDTH] IN BLOOD BY AUTOMATED COUNT: 14.2 % (ref 10–15)
GLUCOSE SERPL-MCNC: 116 MG/DL (ref 70–99)
GLUCOSE UR STRIP-MCNC: NEGATIVE MG/DL
HCT VFR BLD AUTO: 31.5 % (ref 35–47)
HGB BLD-MCNC: 10.6 G/DL (ref 11.7–15.7)
HGB UR QL STRIP: NEGATIVE
IMM GRANULOCYTES # BLD: 0.1 10E3/UL
IMM GRANULOCYTES NFR BLD: 1 %
KETONES UR STRIP-MCNC: NEGATIVE MG/DL
LEUKOCYTE ESTERASE UR QL STRIP: ABNORMAL
LYMPHOCYTES # BLD AUTO: 0.8 10E3/UL (ref 0.8–5.3)
LYMPHOCYTES NFR BLD AUTO: 6 %
MCH RBC QN AUTO: 29.9 PG (ref 26.5–33)
MCHC RBC AUTO-ENTMCNC: 33.7 G/DL (ref 31.5–36.5)
MCV RBC AUTO: 89 FL (ref 78–100)
MONOCYTES # BLD AUTO: 0.7 10E3/UL (ref 0–1.3)
MONOCYTES NFR BLD AUTO: 5 %
NEUTROPHILS # BLD AUTO: 10.2 10E3/UL (ref 1.6–8.3)
NEUTROPHILS NFR BLD AUTO: 86 %
NITRATE UR QL: NEGATIVE
NRBC # BLD AUTO: 0 10E3/UL
NRBC BLD AUTO-RTO: 0 /100
PH UR STRIP: 6 [PH] (ref 5–7)
PLATELET # BLD AUTO: 218 10E3/UL (ref 150–450)
POTASSIUM SERPL-SCNC: 3.6 MMOL/L (ref 3.4–5.3)
PROT SERPL-MCNC: 6.3 G/DL (ref 6.4–8.3)
RBC # BLD AUTO: 3.55 10E6/UL (ref 3.8–5.2)
RBC URINE: 11 /HPF
SODIUM SERPL-SCNC: 134 MMOL/L (ref 135–145)
SP GR UR STRIP: 1 (ref 1–1.03)
SQUAMOUS EPITHELIAL: 21 /HPF
TRANSITIONAL EPI: <1 /HPF
UROBILINOGEN UR STRIP-MCNC: <2 MG/DL
WBC # BLD AUTO: 12 10E3/UL (ref 4–11)
WBC URINE: 56 /HPF

## 2024-05-12 PROCEDURE — 36415 COLL VENOUS BLD VENIPUNCTURE: CPT | Performed by: INTERNAL MEDICINE

## 2024-05-12 PROCEDURE — 81001 URINALYSIS AUTO W/SCOPE: CPT | Performed by: INTERNAL MEDICINE

## 2024-05-12 PROCEDURE — 250N000013 HC RX MED GY IP 250 OP 250 PS 637: Performed by: INTERNAL MEDICINE

## 2024-05-12 PROCEDURE — 250N000011 HC RX IP 250 OP 636: Performed by: INTERNAL MEDICINE

## 2024-05-12 PROCEDURE — 87086 URINE CULTURE/COLONY COUNT: CPT | Performed by: INTERNAL MEDICINE

## 2024-05-12 PROCEDURE — 80053 COMPREHEN METABOLIC PANEL: CPT | Performed by: INTERNAL MEDICINE

## 2024-05-12 PROCEDURE — 87633 RESP VIRUS 12-25 TARGETS: CPT | Performed by: INTERNAL MEDICINE

## 2024-05-12 PROCEDURE — 87581 M.PNEUMON DNA AMP PROBE: CPT | Performed by: INTERNAL MEDICINE

## 2024-05-12 PROCEDURE — 87637 SARSCOV2&INF A&B&RSV AMP PRB: CPT | Performed by: INTERNAL MEDICINE

## 2024-05-12 PROCEDURE — 85025 COMPLETE CBC W/AUTO DIFF WBC: CPT | Performed by: INTERNAL MEDICINE

## 2024-05-12 RX ORDER — METOCLOPRAMIDE 10 MG/1
10 TABLET ORAL EVERY 6 HOURS PRN
Status: DISCONTINUED | OUTPATIENT
Start: 2024-05-12 | End: 2024-05-13 | Stop reason: HOSPADM

## 2024-05-12 RX ORDER — METOCLOPRAMIDE HYDROCHLORIDE 5 MG/ML
10 INJECTION INTRAMUSCULAR; INTRAVENOUS EVERY 6 HOURS PRN
Status: DISCONTINUED | OUTPATIENT
Start: 2024-05-12 | End: 2024-05-13 | Stop reason: HOSPADM

## 2024-05-12 RX ORDER — PROCHLORPERAZINE 25 MG
25 SUPPOSITORY, RECTAL RECTAL EVERY 12 HOURS PRN
Status: DISCONTINUED | OUTPATIENT
Start: 2024-05-12 | End: 2024-05-13 | Stop reason: HOSPADM

## 2024-05-12 RX ORDER — ONDANSETRON 4 MG/1
4 TABLET, ORALLY DISINTEGRATING ORAL EVERY 6 HOURS PRN
Status: DISCONTINUED | OUTPATIENT
Start: 2024-05-12 | End: 2024-05-13 | Stop reason: HOSPADM

## 2024-05-12 RX ORDER — DEXTROSE, SODIUM CHLORIDE, SODIUM LACTATE, POTASSIUM CHLORIDE, AND CALCIUM CHLORIDE 5; .6; .31; .03; .02 G/100ML; G/100ML; G/100ML; G/100ML; G/100ML
INJECTION, SOLUTION INTRAVENOUS CONTINUOUS
Status: DISCONTINUED | OUTPATIENT
Start: 2024-05-12 | End: 2024-05-13 | Stop reason: HOSPADM

## 2024-05-12 RX ORDER — PROCHLORPERAZINE MALEATE 10 MG
10 TABLET ORAL EVERY 6 HOURS PRN
Status: DISCONTINUED | OUTPATIENT
Start: 2024-05-12 | End: 2024-05-13 | Stop reason: HOSPADM

## 2024-05-12 RX ORDER — ACETAMINOPHEN 325 MG/1
650 TABLET ORAL EVERY 4 HOURS PRN
Status: DISCONTINUED | OUTPATIENT
Start: 2024-05-12 | End: 2024-05-13 | Stop reason: HOSPADM

## 2024-05-12 RX ORDER — ONDANSETRON 2 MG/ML
4 INJECTION INTRAMUSCULAR; INTRAVENOUS EVERY 6 HOURS PRN
Status: DISCONTINUED | OUTPATIENT
Start: 2024-05-12 | End: 2024-05-13 | Stop reason: HOSPADM

## 2024-05-12 RX ORDER — ONDANSETRON 4 MG/1
4-8 TABLET, ORALLY DISINTEGRATING ORAL EVERY 8 HOURS PRN
Qty: 20 TABLET | Refills: 0 | Status: ON HOLD | OUTPATIENT
Start: 2024-05-12 | End: 2024-07-22

## 2024-05-12 RX ADMIN — ACETAMINOPHEN 650 MG: 325 TABLET ORAL at 23:14

## 2024-05-12 RX ADMIN — SODIUM CHLORIDE, SODIUM LACTATE, POTASSIUM CHLORIDE, CALCIUM CHLORIDE AND DEXTROSE MONOHYDRATE: 5; 600; 310; 30; 20 INJECTION, SOLUTION INTRAVENOUS at 23:20

## 2024-05-12 ASSESSMENT — ACTIVITIES OF DAILY LIVING (ADL): ADLS_ACUITY_SCORE: 35

## 2024-05-12 NOTE — ED PROVIDER NOTES
EMERGENCY DEPARTMENT ENCOUNTER     NAME: Giovanny Black   AGE: 33 year old female   YOB: 1990   MRN: [id number]   EVALUATION DATE & TIME: 5/11/2024 10:14 PM   PCP: Kristie Diaz     Chief Complaint   Patient presents with    Nausea, Vomiting, & Diarrhea   :    FINAL IMPRESSION       1. Nausea vomiting and diarrhea           ED COURSE & MEDICAL DECISION MAKING    10:20 PM I met with the patient to obtain patient history and performed a physical exam. Discussed plan for ED work up including potential diagnostic studies and interventions.  11:59 PM Reevaluated and updated the patient on findings. We discussed the plan for discharge and the patient is agreeable. Reviewed supportive cares, symptomatic treatment, outpatient follow up, and reasons to return to the Emergency Department. Patient to be discharged by ED RN.     Pertinent Labs & Imaging studies reviewed. (See chart for details)   33 year old female  presents to the Emergency Department for evaluation of nausea vomiting and diarrhea. Initial Vitals Reviewed. Initial exam notable for well-appearing patient with a gravid abdomen who was initially mildly tachycardic.  No abdominal tenderness that would be concerning for something like appendicitis.  She states that she is actually started to improve but still feels like she is dehydrated and she has continued nausea.  She does have a toddler at home who is often sick and is currently ill as well.  I did check labs and other than a slightly low CO2 which is likely from the vomiting and diarrhea, her electrolytes and creatinine are acceptable.  After some IV fluids and Zofran she is able to hold down some liquids and even some pudding and crackers and feels improved and comfortable with discharge.  At this point in gestation, I do feel comfortable giving her a prescription for Zofran for any continued nausea vomiting at home and she is comfortable with this.           At the conclusion of the  "encounter I discussed the results of all of the tests and the disposition. The questions were answered. The patient or family acknowledged understanding and was agreeable with the care plan.     0 minutes critical care time, see procedure note below for details if relevant    Medical Decision Making    History:  Supplemental history from: Documented in chart  External Record(s) reviewed: Documented in chart, OB visit 4/30/2024    Work Up:  Chart documentation includes differential considered and any EKGs or imaging independently interpreted by provider, where specified.  In additional to work up documented, I considered the following work up: Documented in chart, if applicable.    External consultation:  Discussion of management with another provider: Documented in chart, if applicable    Complicating factors:  Care impacted by chronic illness: N/A  Care affected by social determinants of health: Access to Medical Care    Disposition considerations: Discharge. I prescribed additional prescription strength medication(s) as charted. I considered admission, but ultimately discharged patient reassuring workup and clinical improvement.        MEDICATIONS GIVEN IN THE EMERGENCY:   Medications   sodium chloride 0.9% BOLUS 1,000 mL (1,000 mLs Intravenous $New Bag 5/11/24 2242)   ondansetron (ZOFRAN) injection 8 mg (8 mg Intravenous $Given 5/11/24 2243)      NEW PRESCRIPTIONS STARTED AT TODAY'S ER VISIT   New Prescriptions    No medications on file     ================================================================   HISTORY OF PRESENT ILLNESS       Patient information was obtained from: patient   Use of Intrepreter: N/A   Giovanny Black is a 33 year old female with history of currently pregnant (26 weeks) who presents nausea, vomiting, and diarrhea.    Patient reports for the past 2 days, she's been dealing with a \"stomach bug.\" She's had nausea, vomiting, diarrhea, decrease in appetite, generalized weakness, and " fatigue. She is currently 26 weeks pregnant with twins. Due to this, she is concerned about her decrease in oral intake. She is still able to tolerate water and apple juice. Her daughter has an ear infection but she doesn't have any ear pain. She otherwise denies associating vaginal bleeding, leaking fluids, or abdominal pain. There were no other concerns/complaints at this time.     ================================================================        PAST HISTORY     PAST MEDICAL HISTORY:   History reviewed. No pertinent past medical history.   PAST SURGICAL HISTORY:   Past Surgical History:   Procedure Laterality Date    ENDOSCOPIC RETROGRADE CHOLANGIOPANCREATOGRAM N/A 6/6/2023    Procedure: ENDOSCOPIC RETROGRADE CHOLANGIOPANCREATOGRAPHY, STONE EXTRACTION;  Surgeon: Satish Adan MD;  Location: [facility] OR    ENT SURGERY      wisdom teeth removal    LAPAROSCOPIC CHOLECYSTECTOMY N/A 5/12/2023    Procedure: CHOLECYSTECTOMY, LAPAROSCOPIC;  Surgeon: Pinky Le DO;  Location: [facility] OR      CURRENT MEDICATIONS:   No current outpatient medications on file.    ALLERGIES:   No Known Allergies   FAMILY HISTORY:   Family History   Problem Relation Age of Onset    No Known Problems Mother     No Known Problems Father     No Known Problems Brother     Diabetes Maternal Grandfather       SOCIAL HISTORY:   Social History     Socioeconomic History    Marital status:    Tobacco Use    Smoking status: Never    Smokeless tobacco: Never   Substance and Sexual Activity    Alcohol use: Yes     Alcohol/week: 1.0 standard drink of alcohol     Types: 1 Standard drinks or equivalent per week     Comment: once a week    Drug use: No    Sexual activity: Not Currently     Partners: Male     Social Determinants of Health      [facility]    Financial Resource Strain    [facility]    Social Connections        VITALS  Patient Vitals for the past 24 hrs:   BP Temp Pulse Resp SpO2 Weight   05/11/24 2213 -- -- -- -- -- 97.5 kg (215 lb)   05/11/24 2210 113/76 98.1  F (36.7  C) 113 17 98 % --        ================================================================    PHYSICAL EXAM     VITAL SIGNS: /76   Pulse 113   Temp 98.1  F (36.7  C)   Resp 17   Wt 97.5 kg (215 lb)   LMP 11/07/2023   SpO2 98%   BMI 34.70 kg/m     Constitutional:  Awake, no acute distress   HENT:  Atraumatic, oropharynx without exudate or erythema, membranes moist  Lymph:  No adenopathy  Eyes: EOM intact, PERRL, no injection  Neck: Supple  Respiratory:  Clear to auscultation bilaterally, no wheezes or crackles   Cardiovascular:  Regular rate and rhythm, single S1 and S2   GI: Gravid, Soft, nontender, nondistended, no rebound or guarding   Musculoskeletal:  Moves all extremities, no lower extremity edema, no deformities    Skin:  Warm, dry  Neurologic:  Alert and oriented x3, no focal deficits noted       ================================================================  LAB       All pertinent labs reviewed and interpreted.   Labs Ordered and Resulted from Time of ED Arrival to Time of ED Departure   BASIC METABOLIC PANEL - Abnormal       Result Value    Sodium 134 (*)     Potassium 4.2      Chloride 102      Carbon Dioxide (CO2) 18 (*)     Anion Gap 14      Urea Nitrogen 7.4      Creatinine 0.57      GFR Estimate >90      Calcium 8.7      Glucose 107 (*)    MAGNESIUM - Normal    Magnesium 2.0          ===============================================================  RADIOLOGY       Reviewed all pertinent imaging. Please see official radiology report.   No orders to display         ================================================================  EKG         I have independently reviewed and interpreted the EKG(s) documented above.      ================================================================  PROCEDURES         I, Lina Landry, am serving as a scribe to document services personally performed by Dr. Talamantes based on my observation and the provider's statements to me. I, Alycia Talamantes MD attest that Lina Landry is acting in a scribe capacity, has observed my performance of the services and has documented them in accordance with my direction.   Alycia Talamantes M.D.   Emergency Medicine   [facility] EMERGENCY DEPARTMENT  [address] [place] [id number]  [phone]  Dept: [phone]      Alycia Talamantes MD  05/12/24 0005

## 2024-05-12 NOTE — ED TRIAGE NOTES
Nausea, diarrhea, vomiting x2 days. Water staying down. No medications prior to arrival. Feels tired/weak, dehydrated. Currently pregnant 26 weeks with twins.     Triage Assessment (Adult)       Row Name 05/11/24 9584          Triage Assessment    Airway WDL WDL        Respiratory WDL    Respiratory WDL WDL        Skin Circulation/Temperature WDL    Skin Circulation/Temperature WDL WDL        Cardiac WDL    Cardiac WDL WDL     Cardiac Rhythm NSR        Peripheral/Neurovascular WDL    Peripheral Neurovascular WDL WDL        Cognitive/Neuro/Behavioral WDL    Cognitive/Neuro/Behavioral WDL WDL

## 2024-05-13 ENCOUNTER — HOSPITAL ENCOUNTER (OUTPATIENT)
Facility: HOSPITAL | Age: 34
End: 2024-05-13
Admitting: INTERNAL MEDICINE
Payer: COMMERCIAL

## 2024-05-13 VITALS
SYSTOLIC BLOOD PRESSURE: 112 MMHG | DIASTOLIC BLOOD PRESSURE: 61 MMHG | TEMPERATURE: 98.2 F | RESPIRATION RATE: 18 BRPM | OXYGEN SATURATION: 95 %

## 2024-05-13 LAB
C PNEUM DNA SPEC QL NAA+PROBE: NOT DETECTED
FLUAV H1 2009 PAND RNA SPEC QL NAA+PROBE: NOT DETECTED
FLUAV H1 RNA SPEC QL NAA+PROBE: NOT DETECTED
FLUAV H3 RNA SPEC QL NAA+PROBE: NOT DETECTED
FLUAV RNA SPEC QL NAA+PROBE: NEGATIVE
FLUAV RNA SPEC QL NAA+PROBE: NOT DETECTED
FLUBV RNA RESP QL NAA+PROBE: NEGATIVE
FLUBV RNA SPEC QL NAA+PROBE: NOT DETECTED
HADV DNA SPEC QL NAA+PROBE: NOT DETECTED
HAV IGM SERPL QL IA: NONREACTIVE
HBV CORE IGM SERPL QL IA: NONREACTIVE
HBV SURFACE AG SERPL QL IA: NONREACTIVE
HCOV PNL SPEC NAA+PROBE: NOT DETECTED
HCV AB SERPL QL IA: NONREACTIVE
HMPV RNA SPEC QL NAA+PROBE: NOT DETECTED
HPIV1 RNA SPEC QL NAA+PROBE: NOT DETECTED
HPIV2 RNA SPEC QL NAA+PROBE: NOT DETECTED
HPIV3 RNA SPEC QL NAA+PROBE: NOT DETECTED
HPIV4 RNA SPEC QL NAA+PROBE: NOT DETECTED
M PNEUMO DNA SPEC QL NAA+PROBE: NOT DETECTED
RSV RNA SPEC NAA+PROBE: NEGATIVE
RSV RNA SPEC QL NAA+PROBE: NOT DETECTED
RSV RNA SPEC QL NAA+PROBE: NOT DETECTED
RV+EV RNA SPEC QL NAA+PROBE: DETECTED
SARS-COV-2 RNA RESP QL NAA+PROBE: NEGATIVE

## 2024-05-13 PROCEDURE — 36415 COLL VENOUS BLD VENIPUNCTURE: CPT | Performed by: INTERNAL MEDICINE

## 2024-05-13 PROCEDURE — 80074 ACUTE HEPATITIS PANEL: CPT | Performed by: INTERNAL MEDICINE

## 2024-05-13 ASSESSMENT — ACTIVITIES OF DAILY LIVING (ADL)
ADLS_ACUITY_SCORE: 35
ADLS_ACUITY_SCORE: 35

## 2024-05-13 NOTE — PROGRESS NOTES
Data: Patient presented to [facility]: 2024 10:04 PM.  Reason for maternal/fetal assessment is decreased fetal movement, nausea and vomiting, and diarrhea and temp of 101 F . Patient reports not being able to keep anything down. Started on  evening. Patient denies leaking of vaginal fluid/rupture of membranes, vaginal bleeding, abdominal pain, pelvic pressure, headache, visual disturbances, epigastric or RUQ pain, significant edema. Patient reports fetal movement is decreased. Patient is a 26w5d .  Prenatal record reviewed. Pregnancy has been uncomplicated.    Vital signs wnl ex P 115 & temp 99.9. Support person is present.     Action: Verbal consent for EFM. Triage assessment completed.     Response: Patient verbalized agreement with plan. Will contact Dr. Alberto with update and further orders.

## 2024-05-13 NOTE — PROGRESS NOTES
0100-Dr. Alberto updated regarding labs pending, orders to put in covid/RSV/Influenza panel instead of respiratory panel.  MD ok for patient to be off the monitor.

## 2024-05-13 NOTE — PROGRESS NOTES
Data: Patient assessed in the [facility] for nausea and vomiting and fever and chills .  Cervical exam not examined.  Membranes intact.  Contractions none.  Action:  Presumed adequate fetal oxygenation documented (see flow record). Discharge instructions reviewed.  Patient instructed to report change in fetal movement, vaginal leaking of fluid or bleeding, abdominal pain, or any concerns related to the pregnancy to her nurse/physician.    Response: Orders to discharge home per Yamileth Alberto.  Patient verbalized understanding of education and verbalized agreement with plan. Discharged to home at 0230.

## 2024-05-13 NOTE — PROGRESS NOTES
0200-Updated Dr. Alberto on lab results.  Ok for patient to discharge home and follow up in clinic this week.  Patient accepting of plan and denies any questions or concerns.

## 2024-05-13 NOTE — PROGRESS NOTES
IV infiltrated and arm is swollen.  Patient denies any pain, no redness noted.  Dr. Alberto aware of infiltration and is still ok with patient to discharge home.

## 2024-05-14 DIAGNOSIS — R19.7 DIARRHEA OF PRESUMED INFECTIOUS ORIGIN: Primary | ICD-10-CM

## 2024-05-14 LAB — BACTERIA UR CULT: NORMAL

## 2024-05-15 ENCOUNTER — LAB (OUTPATIENT)
Dept: LAB | Facility: HOSPITAL | Age: 34
End: 2024-05-15
Payer: COMMERCIAL

## 2024-05-15 DIAGNOSIS — R19.7 DIARRHEA OF PRESUMED INFECTIOUS ORIGIN: ICD-10-CM

## 2024-05-15 LAB
ADV 40+41 DNA STL QL NAA+NON-PROBE: NEGATIVE
ASTRO TYP 1-8 RNA STL QL NAA+NON-PROBE: NEGATIVE
C CAYETANENSIS DNA STL QL NAA+NON-PROBE: NEGATIVE
CAMPYLOBACTER DNA SPEC NAA+PROBE: NEGATIVE
CRYPTOSP DNA STL QL NAA+NON-PROBE: NEGATIVE
E COLI O157 DNA STL QL NAA+NON-PROBE: ABNORMAL
E HISTOLYT DNA STL QL NAA+NON-PROBE: NEGATIVE
EAEC ASTA GENE ISLT QL NAA+PROBE: NEGATIVE
EC STX1+STX2 GENES STL QL NAA+NON-PROBE: NEGATIVE
EPEC EAE GENE STL QL NAA+NON-PROBE: NEGATIVE
ETEC LTA+ST1A+ST1B TOX ST NAA+NON-PROBE: NEGATIVE
G LAMBLIA DNA STL QL NAA+NON-PROBE: NEGATIVE
NOROVIRUS GI+II RNA STL QL NAA+NON-PROBE: POSITIVE
P SHIGELLOIDES DNA STL QL NAA+NON-PROBE: NEGATIVE
RVA RNA STL QL NAA+NON-PROBE: POSITIVE
SALMONELLA SP RPOD STL QL NAA+PROBE: NEGATIVE
SAPO I+II+IV+V RNA STL QL NAA+NON-PROBE: NEGATIVE
SHIGELLA SP+EIEC IPAH ST NAA+NON-PROBE: NEGATIVE
V CHOLERAE DNA SPEC QL NAA+PROBE: NEGATIVE
VIBRIO DNA SPEC NAA+PROBE: NEGATIVE
Y ENTEROCOL DNA STL QL NAA+PROBE: NEGATIVE

## 2024-05-15 PROCEDURE — 87507 IADNA-DNA/RNA PROBE TQ 12-25: CPT

## 2024-07-21 ENCOUNTER — HEALTH MAINTENANCE LETTER (OUTPATIENT)
Age: 34
End: 2024-07-21

## 2024-07-22 ENCOUNTER — HOSPITAL ENCOUNTER (OUTPATIENT)
Facility: HOSPITAL | Age: 34
End: 2024-07-22
Admitting: OBSTETRICS & GYNECOLOGY
Payer: COMMERCIAL

## 2024-07-22 ENCOUNTER — HOSPITAL ENCOUNTER (OUTPATIENT)
Facility: HOSPITAL | Age: 34
Discharge: HOME | End: 2024-07-22
Attending: OBSTETRICS & GYNECOLOGY | Admitting: OBSTETRICS & GYNECOLOGY
Payer: COMMERCIAL

## 2024-07-22 VITALS — BODY MASS INDEX: 36.96 KG/M2 | WEIGHT: 230 LBS | HEIGHT: 66 IN

## 2024-07-22 PROBLEM — Z36.89 ENCOUNTER FOR TRIAGE IN PREGNANT PATIENT: Status: ACTIVE | Noted: 2024-07-22

## 2024-07-22 PROCEDURE — G0463 HOSPITAL OUTPT CLINIC VISIT: HCPCS

## 2024-07-22 RX ORDER — ASPIRIN 81 MG/1
81 TABLET, CHEWABLE ORAL DAILY
Status: ON HOLD | COMMUNITY
End: 2024-08-01

## 2024-07-22 RX ORDER — LIDOCAINE 40 MG/G
CREAM TOPICAL
Status: DISCONTINUED | OUTPATIENT
Start: 2024-07-22 | End: 2024-07-22 | Stop reason: HOSPADM

## 2024-07-22 ASSESSMENT — ACTIVITIES OF DAILY LIVING (ADL)
ADLS_ACUITY_SCORE: 35
ADLS_ACUITY_SCORE: 18
ADLS_ACUITY_SCORE: 35

## 2024-07-22 NOTE — PROGRESS NOTES
Data: Patient assessed in the [facility] for  fetal surveillance after clinic staff reported seeing spontaneous decelerations on fetal monitor .  Cervical exam not examined.  Membranes intact.  Contractions are occasional.  Action:  Presumed adequate fetal oxygenation documented (see flow record). Discharge instructions reviewed.  Patient instructed to report change in fetal movement, vaginal leaking of fluid or bleeding, abdominal pain, or any concerns related to the pregnancy to her nurse/physician.    Response: Orders to discharge home per Linda Perez.  Patient verbalized understanding of education and verbalized agreement with plan. Discharged to home at 1525.

## 2024-07-22 NOTE — H&P
"24   Giovanny Black   1990    History&Physical     HPI: 33 year old  at 36w6d presents for FHR monitoring due to non-reactive NST with decel in FHRT of baby B in clinic. Feeling fetal movement. Denies leaking of fluid. Denies vaginal bleeding. Pregnancy complicated by di/di twins.     OB History: reviewed  OB History    Para Term  AB Living   2 1 0 1 0 1   SAB IAB Ectopic Multiple Live Births   0 0 0 0 1      # Outcome Date GA Lbr Meño/2nd Weight Sex Type Anes PTL Lv   2 Current            1  22 36w1d 09:26 / 04:22 2.62 kg (5 lb 12.4 oz) F Vag-Spont  Y JOSE      Complications: Prolonged second stage of labor      Name: BG GIOVANNY GATES      Apgar1: 4  Apgar5: 7       GYN History: denies history of STDs or pelvic infections; denies history of abnormal PAP smears    Medical History: reviewed  No past medical history on file.    Surgical History: reviewed  Past Surgical History:   Procedure Laterality Date    ENDOSCOPIC RETROGRADE CHOLANGIOPANCREATOGRAM N/A 2023    Procedure: ENDOSCOPIC RETROGRADE CHOLANGIOPANCREATOGRAPHY, STONE EXTRACTION;  Surgeon: Satish Adan MD;  Location: [facility] [place] OR    ENT SURGERY      wisdom teeth removal    LAPAROSCOPIC CHOLECYSTECTOMY N/A 2023    Procedure: CHOLECYSTECTOMY, LAPAROSCOPIC;  Surgeon: Pinky Le DO;  Location: [facility] [place] [facility]       Family History: denies history of bleeding or clotting disorders     Social History:  denies use of tobacco, drugs, or alcohol    Medications: PNV, bASA    Allergies: reviewed   No Known Allergies    Vital signs: /83                    Height: 167.6 cm (5' 6\") Weight: 104.3 kg (230 lb) (clinic weight)  Estimated body mass index is 37.12 kg/m  as calculated from the following:    Height as of this encounter: 1.676 m (5' 6\").    Weight as of this encounter: 104.3 kg (230 lb).    Physical Exam:  General:  no distress  Abdomen: gravid, nontender  Extremities: no calf pain, 1+ " symmetric edema in legs    BSUS: Vertex/Transverse; Dr. Perez present at bedside for >30 minutes due to difficulty monitoring; both babies with +FHR during this time, difficult to trace separately due to proximity to eachother    FHRT A: 140bpm, + accelerations, no decelarations, mod variability  FHRT B: 135bpm, + accelerations, no decelarations, mod variability  TOCO: irregular    Assessment/Plan: 33 year old  at 36w6d  1. Non-reassuring  testing  - fetal status reassuring on FHRT after able to monitor both babies  2. Fetal Status, di/di twins   - level II MFM anatomy US normal  - growth US 7/15/24: A EFW 53.9%, B 30.4%, discordance 7.9%  - MIL scheduled at 38 weeks on 24  3. Marginal cord insertion x2   - growth US as above  4. H/o  delivery PPROM  - G1 delivery at 36 weeks due to PPROM  5. Prenatal labs   - GBS neg   - Blood Type: A+  - Antibody screen: negative   - RPR NR, HepBsAg NR, Rubella immune, HIV NR, HepCAb NR   - passed 1h GTT    Discussed plan of care with patient and , and the patient expressed understanding. Opportunity for questions given, and all questions were answered.    Linda Perez MD

## 2024-07-22 NOTE — PROGRESS NOTES
Data: [facility] presented to [facility]: 2024  1:00 PM.  Reason for maternal/fetal assessment is  fetal surveillance after clinic staff reported seeing spontaneous decelerations on fetal monitoring with contractions . Patient reports normal fetal movement and presence of usual uterine tightening that she experiences daily. Patient denies leaking of vaginal fluid/rupture of membranes, vaginal bleeding, abdominal pain, pelvic pressure, nausea, vomiting, headache, visual disturbances, epigastric or RUQ pain, significant edema. Patient reports fetal movement is normal. Patient is a 36w6d .  Prenatal record reviewed. Pregnancy has been complicated by multiple gestation and swelling. DTR WDL, no clonus.    Vital signs wnl. Support person, Rory, is present and supportive.    Action: Verbal consent for EFM. Triage assessment completed.     Response: Patient verbalized agreement with plan. Resident OB requested at bedside for evaluation of Twin B lie/FHR-able to count FHR via ultrasound 125-140 and + fetal movement multiple times during encounter. When unable to externally trace FHR on monitor, Dr Perez requested to bedside. She remained there for over an hour-reconfirming fetal heart rate, movement and fetal lie which has changed since arrival from transverse to almost vertex per Dr Perez. with update and further orders. Consistent FHR tracing of both twins at 1448. Dr Perez evaluated at bedside again at 1452 and at 1518. Satisfied with EFM tracing of both twins-reviewed interrupted tracing of Twin B with fetal movement and increase in FHR where apparent acceleration does not fully trace. Orders to remove monitors and discharge to home. Giovanny agreeable with plan of care.

## 2024-07-30 ENCOUNTER — ANESTHESIA EVENT (OUTPATIENT)
Dept: OBGYN | Facility: HOSPITAL | Age: 34
End: 2024-07-30
Payer: COMMERCIAL

## 2024-07-30 ENCOUNTER — ANESTHESIA (OUTPATIENT)
Dept: OBGYN | Facility: HOSPITAL | Age: 34
End: 2024-07-30
Payer: COMMERCIAL

## 2024-07-30 PROBLEM — O30.049 TWIN PREGNANCY, TWINS DICHORIONIC AND DIAMNIOTIC: Status: ACTIVE | Noted: 2024-07-30

## 2024-07-30 PROCEDURE — 59400 OBSTETRICAL CARE: CPT | Performed by: NURSE ANESTHETIST, CERTIFIED REGISTERED

## 2024-07-30 PROCEDURE — 59400 OBSTETRICAL CARE: CPT | Performed by: STUDENT IN AN ORGANIZED HEALTH CARE EDUCATION/TRAINING PROGRAM

## 2024-07-30 NOTE — ANESTHESIA PROCEDURE NOTES
Epidural catheter Procedure Note    Pre-Procedure   Staff -        Anesthesiologist:  Raymond Jiang DO       Performed By: anesthesiologist       Location: OB       Procedure Start/Stop Times: 7/30/2024 9:30 AM and 7/30/2024 9:55 AM       Pre-Anesthestic Checklist: patient identified, IV checked, risks and benefits discussed, informed consent, monitors and equipment checked, pre-op evaluation, at physician/surgeon's request and post-op pain management  Timeout:       Correct Patient: Yes        Correct Procedure: Yes        Correct Site: Yes        Correct Position: Yes   Procedure Documentation  Procedure: epidural catheter       Patient Position: sitting       Patient Prep/Sterile Barriers: sterile gloves, mask, patient draped       Skin prep: Betadine and Chloraprep       Local skin infiltrated with mL of 1% lidocaine.        Insertion Site: L2-3, L3-4. (midline approach).       Technique: LORT saline and LORT air        ALEX at 7 cm.       Needle Type: [employer] Interactiony needle       Needle Gauge: 17.        Needle Length (Inches): 3.5        Catheter: 20 G.          Catheter threaded easily.           Threaded 12 cm at skin.         # of attempts: 1 and  # of redirects:  0    Assessment/Narrative         Paresthesias: No.       Test dose of 3 mL lidocaine 1.5% w/ 1:200,000 epinephrine at 09:55 CDT.         Test dose negative, 3 minutes after injection, for signs of intravascular, subdural, or intrathecal injection.       Insertion/Infusion Method: LORT saline and LORT air       Aspiration negative for Heme or CSF via Epidural Catheter.    Medication(s) Administered   Medication Administration Time: 7/30/2024 9:30 AM     Comments:  Epi   R/b/a discussed, patient agrees to have an epidural catheter placement.   Local infiltration of skin, advancement of needle without paresthesias, excellent Alex to NS.   Catheter advanced without resistance nor paresthesias, negative aspiration for heme or CSF.  Remaining 2 mL  "epidural test dose diluted with 3 mL sterile water and given via epidural bolus.  Patient tolerated the procedure well, all questions answered.        FOR [facility] ONLY:   Pain Team Contact information: please page the Pain Team Via [employer]. Search \"Pain\". During daytime hours, please page the attending first. At night please page the resident first.      "

## 2024-07-30 NOTE — ANESTHESIA PREPROCEDURE EVALUATION
Anesthesia Pre-Procedure Evaluation    Patient: Giovanny Black   MRN: [id number] : 1990        Procedure :   Induction       Past Medical History:   Diagnosis Date     Chicken pox 11/10/1991      Past Surgical History:   Procedure Laterality Date     ENDOSCOPIC RETROGRADE CHOLANGIOPANCREATOGRAM N/A 2023    Procedure: ENDOSCOPIC RETROGRADE CHOLANGIOPANCREATOGRAPHY, STONE EXTRACTION;  Surgeon: Satish Adan MD;  Location: [facility] OR     ENT SURGERY      wisdom teeth removal     LAPAROSCOPIC CHOLECYSTECTOMY N/A 2023    Procedure: CHOLECYSTECTOMY, LAPAROSCOPIC;  Surgeon: Pinky Le DO;  Location: [facility] OR      No Known Allergies   Social History     Tobacco Use     Smoking status: Never     Passive exposure: Never     Smokeless tobacco: Never   Substance Use Topics     Alcohol use: Not Currently     Alcohol/week: 1.0 standard drink of alcohol     Types: 1 Standard drinks or equivalent per week     Comment: once a week      Wt Readings from Last 1 Encounters:   24 104.3 kg (230 lb)        Anesthesia Evaluation            ROS/MED HX  ENT/Pulmonary:  - neg pulmonary ROS     Neurologic:  - neg neurologic ROS     Cardiovascular:  - neg cardiovascular ROS     METS/Exercise Tolerance:     Hematologic:       Musculoskeletal:       GI/Hepatic:  - neg GI/hepatic ROS     Renal/Genitourinary:  - neg Renal ROS     Endo:       Psychiatric/Substance Use:       Infectious Disease:       Malignancy:       Other:            Physical Exam    Airway        Mallampati: II   TM distance: > 3 FB   Neck ROM: full     Respiratory Devices and Support         Dental       (+) Minor Abnormalities - some fillings, tiny chips      Cardiovascular             Pulmonary   pulmonary exam normal            OUTSIDE LABS:  CBC:   Lab Results   Component Value Date    WBC 9.9 2024    WBC 12.0 (H) 2024    HGB 10.9 (L) 2024    HGB 10.6 (L) 2024    HCT 32.6 (L) 2024    HCT 31.5  "(L) 05/12/2024     07/30/2024     05/12/2024     BMP:   Lab Results   Component Value Date     (L) 05/12/2024     (L) 05/11/2024    POTASSIUM 3.6 05/12/2024    POTASSIUM 4.2 05/11/2024    CHLORIDE 103 05/12/2024    CHLORIDE 102 05/11/2024    CO2 20 (L) 05/12/2024    CO2 18 (L) 05/11/2024    BUN 5.7 (L) 05/12/2024    BUN 7.4 05/11/2024    CR 0.59 05/12/2024    CR 0.57 05/11/2024     (H) 05/12/2024     (H) 05/11/2024     COAGS: No results found for: \"PTT\", \"INR\", \"FIBR\"  POC:   Lab Results   Component Value Date    HCG Negative 05/12/2023    HCGS Negative 04/13/2023     HEPATIC:   Lab Results   Component Value Date    ALBUMIN 3.2 (L) 05/12/2024    PROTTOTAL 6.3 (L) 05/12/2024     (H) 05/12/2024     (H) 05/12/2024    ALKPHOS 117 05/12/2024    BILITOTAL 0.3 05/12/2024     OTHER:   Lab Results   Component Value Date    NALLELY 8.0 (L) 05/12/2024    MAG 2.0 05/11/2024    LIPASE 40 06/05/2023       Anesthesia Plan    ASA Status:  2    NPO Status:  NPO Appropriate    Anesthesia Type: Epidural.              Consents    Anesthesia Plan(s) and associated risks, benefits, and realistic alternatives discussed. Questions answered and patient/representative(s) expressed understanding.     - Discussed: Risks, Benefits and Alternatives for BOTH SEDATION and the PROCEDURE were discussed     - Discussed with:  Patient      - Extended Intubation/Ventilatory Support Discussed: No.      - Patient is DNR/DNI Status: No     Use of blood products discussed: No .     Postoperative Care            Comments:             Raymond Jiang,     I have reviewed the pertinent notes and labs in the chart from the past 30 days and (re)examined the patient.  Any updates or changes from those notes are reflected in this note.             # Drug Induced Platelet Defect: home medication list includes an antiplatelet medication      "

## 2024-07-30 NOTE — ANESTHESIA CARE TRANSFER NOTE
Patient: Giovanny Black    Procedure: Procedure(s):   SECTION  Induction    Diagnosis: Twin gestation in third trimester, unspecified multiple gestation type [O30.003]  Diagnosis Additional Information: No value filed.    Anesthesia Type:   Epidural     Note:    Oropharynx: oropharynx clear of all foreign objects and spontaneously breathing  Level of Consciousness: awake  Oxygen Supplementation: room air    Independent Airway: airway patency satisfactory and stable  Dentition: dentition unchanged  Vital Signs Stable: post-procedure vital signs reviewed and stable  Report to RN Given: handoff report given  Patient transferred to: Labor and Delivery  Comments: Anesthesia no longer needed per Dr. Perez, still in OR but 2nd baby is head down and patient is pushing.  Handoff Report: Identifed the Patient, Identified the Reponsible Provider, Reviewed the pertinent medical history, Discussed the surgical course, Reviewed Intra-OP anesthesia mangement and issues during anesthesia, Set expectations for post-procedure period and Allowed opportunity for questions and acknowledgement of understanding  Vitals:  Vitals Value Taken Time   /72 24 1819   Temp     Pulse 111 24 1819   Resp 14 24 1819   SpO2 98 % 24 181       Electronically Signed By: ATIYA Almazan CRNA  2024  6:19 PM

## 2024-07-31 NOTE — ADDENDUM NOTE
Addendum  created 07/31/24 1732 by Raymond Jiang,     Attestation recorded in Intraprocedure, Intraprocedure Attestations filed

## 2024-07-31 NOTE — ANESTHESIA POSTPROCEDURE EVALUATION
Patient: Giovanny Black    Procedure: Procedure(s):   SECTION  Induction    Anesthesia Type:  Epidural    Note:  Disposition: Inpatient   Postop Pain Control: Uneventful            Sign Out: Well controlled pain   PONV: No   Neuro/Psych: Uneventful            Sign Out: Acceptable/Baseline neuro status   Airway/Respiratory: Uneventful            Sign Out: Acceptable/Baseline resp. status   CV/Hemodynamics: Uneventful            Sign Out: Acceptable CV status; No obvious hypovolemia; No obvious fluid overload   Other NRE:    DID A NON-ROUTINE EVENT OCCUR?     Event details/Postop Comments:  Spinal has worn off. No headache. No back pain. No need for follow up.       Last vitals:  Vitals:    24 0248 24 0637 24 0807   BP: 133/73 119/78 115/69   Pulse: 83 81 79   Resp: 18 16 18   Temp:  36.5  C (97.7  F) 36.6  C (97.8  F)   SpO2: 97% 97% 98%       Electronically Signed By: Meng Jackson MD  2024  10:02 AM

## 2024-08-03 ENCOUNTER — PATIENT OUTREACH (OUTPATIENT)
Dept: CARE COORDINATION | Facility: CLINIC | Age: 34
End: 2024-08-03
Payer: COMMERCIAL

## 2024-08-03 NOTE — PROGRESS NOTES
Connected Care Resource Center:   [facility] Contact  [facility]/Voicemail     Clinical Data: Post-Discharge Outreach     Outreach attempted x 2.  Left message on patient's voicemail, providing [facility]'s central phone number of [phone] ([phone]) for questions/concerns and/or to schedule an appt with an [facility] provider, if they do not have a PCP.      Plan:  [facility] will do no further outreaches at this time.       Shayy Flores MA  [facility] Center, [facility]    *Connected Care Resource Team does NOT follow patient ongoing. Referrals are identified based on internal discharge reports and the outreach is to ensure patient has an understanding of their discharge instructions.

## 2025-08-10 ENCOUNTER — HEALTH MAINTENANCE LETTER (OUTPATIENT)
Age: 35
End: 2025-08-10

## (undated) DEVICE — ENDO FUSION OMNI-TOME G31903

## (undated) DEVICE — SUCTION MANIFOLD NEPTUNE 2 SYS 1 PORT 702-025-000

## (undated) DEVICE — PLATE GROUNDING ADULT W/CORD 9165L

## (undated) DEVICE — SOL WATER IRRIG 1000ML BOTTLE 2F7114

## (undated) DEVICE — WIRE GUIDE 0.35X270CM STRAIGHT TIP VISIGLIDE G-260-3527S

## (undated) DEVICE — TUBING SUCTION MEDI-VAC 1/4"X20' N620A - HE

## (undated) DEVICE — BALLOON EXTRACTION 15X1950MM 3.2MM TL B-V243Q-A

## (undated) RX ORDER — FENTANYL CITRATE 50 UG/ML
INJECTION, SOLUTION INTRAMUSCULAR; INTRAVENOUS
Status: DISPENSED
Start: 2023-06-06

## (undated) RX ORDER — PROPOFOL 10 MG/ML
INJECTION, EMULSION INTRAVENOUS
Status: DISPENSED
Start: 2023-06-06

## (undated) RX ORDER — ONDANSETRON 2 MG/ML
INJECTION INTRAMUSCULAR; INTRAVENOUS
Status: DISPENSED
Start: 2023-06-06

## (undated) RX ORDER — LIDOCAINE HYDROCHLORIDE 10 MG/ML
INJECTION, SOLUTION EPIDURAL; INFILTRATION; INTRACAUDAL; PERINEURAL
Status: DISPENSED
Start: 2023-06-06

## (undated) RX ORDER — DEXAMETHASONE SODIUM PHOSPHATE 10 MG/ML
INJECTION, SOLUTION INTRAMUSCULAR; INTRAVENOUS
Status: DISPENSED
Start: 2023-06-06